# Patient Record
Sex: FEMALE | Race: WHITE | NOT HISPANIC OR LATINO | Employment: OTHER | URBAN - METROPOLITAN AREA
[De-identification: names, ages, dates, MRNs, and addresses within clinical notes are randomized per-mention and may not be internally consistent; named-entity substitution may affect disease eponyms.]

---

## 2017-09-13 ENCOUNTER — GENERIC CONVERSION - ENCOUNTER (OUTPATIENT)
Dept: OTHER | Facility: OTHER | Age: 72
End: 2017-09-13

## 2019-11-25 ENCOUNTER — EVALUATION (OUTPATIENT)
Dept: PHYSICAL THERAPY | Age: 74
End: 2019-11-25
Payer: COMMERCIAL

## 2019-11-25 DIAGNOSIS — M25.562 LEFT KNEE PAIN, UNSPECIFIED CHRONICITY: Primary | ICD-10-CM

## 2019-11-25 DIAGNOSIS — Z96.652 S/P TKR (TOTAL KNEE REPLACEMENT), LEFT: ICD-10-CM

## 2019-11-25 PROCEDURE — G8979 MOBILITY GOAL STATUS: HCPCS | Performed by: PHYSICAL THERAPIST

## 2019-11-25 PROCEDURE — 97113 AQUATIC THERAPY/EXERCISES: CPT | Performed by: PHYSICAL THERAPIST

## 2019-11-25 PROCEDURE — G8978 MOBILITY CURRENT STATUS: HCPCS | Performed by: PHYSICAL THERAPIST

## 2019-11-25 PROCEDURE — 97162 PT EVAL MOD COMPLEX 30 MIN: CPT | Performed by: PHYSICAL THERAPIST

## 2019-11-25 RX ORDER — PRAVASTATIN SODIUM 20 MG
20 TABLET ORAL DAILY
COMMUNITY
End: 2021-11-27 | Stop reason: HOSPADM

## 2019-11-25 NOTE — PROGRESS NOTES
PT Evaluation     Today's date: 2019  Patient name: Jim Bryson  : 1945  MRN: [id number]  Referring provider: Keron Mora MD  Dx:   Encounter Diagnosis     ICD-10-CM    1  Left knee pain, unspecified chronicity M25 562    2  S/P TKR (total knee replacement), left Z96 652        Start Time: 1500  Stop Time: 1600  Total time in clinic (min): 60 minutes    Assessment  Assessment details: Jim Bryson is a 66 y o  female who presents with pain, decreased strength, decreased ROM, joint effusion, ambulatory dysfunction and postural  dysfunction  Due to these impairments, Patient has difficulty performing a/iadls  Patient's clinical presentation is consistent with their referring diagnosis of left TKR  Patient would benefit from skilled physical therapy to address their aforementioned impairments, improve their level of function and to improve their overall quality of life  Impairments: abnormal coordination, abnormal gait, abnormal muscle tone, abnormal or restricted ROM, abnormal movement, activity intolerance, impaired balance, impaired physical strength, lacks appropriate home exercise program, pain with function, safety issue, weight-bearing intolerance, poor posture  and poor body mechanics  Understanding of Dx/Px/POC: good   Prognosis: good    Goals  ST-3 WEEKS  1  Decrease pain by 2 points on VAS at its worst   2   Increase ROM by > 5 deg in all deficients planes  3   Increase LE by 1/2 MMT grade in all deficient planes  LT-6 WEEKS  1  Patient to be independent with a/iadls and improve balance to 10 sec SLS  2  Increase functional activities for leisure and home activities to previous LOF and improve TUG score 30 sec to 20 sec  3   Independent with HEP and/or fitness program     Plan  Patient would benefit from: skilled physical therapy  Planned modality interventions: cryotherapy, electrical stimulation/Russian stimulation, thermotherapy: hydrocollator packs and unattended electrical stimulation  Planned therapy interventions: activity modification, behavior modification, body mechanics training, aquatic therapy, flexibility, functional ROM exercises, home exercise program, IADL retraining, joint mobilization, manual therapy, neuromuscular re-education, patient education, postural training, strengthening, stretching, therapeutic activities and therapeutic exercise  Frequency: 2x week (2-3x week)  Duration in weeks: 12  Plan of Care beginning date: 2019  Plan of Care expiration date: 2020  Treatment plan discussed with: patient        Subjective Evaluation    History of Present Illness  Date of onset: 3/2/2017  Date of surgery: 2019  Mechanism of injury: Left knee DJD, complains of left knee weakness with swelling and diminished balance,  left TKR also right TKR  and LBP and cervical fusion , home PT x 6 weeks for knee  Quality of life: good    Pain  Current pain rating: 3  At best pain ratin  At worst pain ratin  Quality: dull ache and tight  Relieving factors: ice, heat and rest  Aggravating factors: stair climbing, standing and walking  Progression: no change    Social Support  Steps to enter house: yes  Stairs in house: yes   Lives in: multiple-level home  Lives with: spouse      Diagnostic Tests  X-ray: abnormal  Treatments  Previous treatment: injection treatment and physical therapy  Patient Goals  Patient goals for therapy: decreased edema, decreased pain, improved balance, increased motion, increased strength and independence with ADLs/IADLs          Objective     Static Posture     Thoracic Spine  Hyperkyphosis  Tenderness     Right Knee   Tenderness in the pes anserinus       Active Range of Motion   Left Knee   Flexion: 125 degrees   Extension: -3 degrees     Right Knee   Flexion: 130 degrees   Extension: 0 degrees     Strength/Myotome Testing     Left Knee   Flexion: 4-  Extension: 3+  Quadriceps contraction: fair    Right Knee Normal strength    Swelling     Left Knee Girth Measurement (cm)   Joint line: 35 cm    Right Knee Girth Measurement (cm)   Joint line: 33 cm    Ambulation     Observational Gait   Gait: antalgic and asymmetric   Decreased walking speed and stride length       Functional Assessment        Single Leg Stance   Left: 1 seconds  Right: 2 seconds  Neuro Exam:     Functional outcomes   TU (seconds)        Precautions: Cervical fusion , LBP/stenosis, OA, HTN    Daily Treatment Diary     Manual                                                                                   Exercise Diary              Water walking 5            Postural training             Gait training 5            Home exercise pgm/patient education             Wall: t/h raises 1            Hip abd/add  1            squats 1            Knee flex/ext             Step-ups (fwd/bkwd/ss)             SLS (eyes open/closed) 2            SLS w UE mvmt  AROM/ball toss             Weight shifting             UE Noodle work x 4              UE AROM             Resistive UE work (paddles, bells, TB)             Core work on noodle (sitting/stdg)             Sit on noodle with movement             Seated on pool bench w proper posture             Ankle df/pf             Hip Ab/add 1            Knee flex/ext 1            Deep water mvmt             Deep water tx/stretching 5            Specific self - stretches wall/steps 5                Modalities              whirlpool 5

## 2019-11-25 NOTE — LETTER
2019    Tommy Malloy MD  [address] [place] [id number]    Patient: Tr Carvajal   YOB: 1945   Date of Visit: 2019     Encounter Diagnosis     ICD-10-CM    1  Left knee pain, unspecified chronicity M25 562    2  S/P TKR (total knee replacement), left Z00 057        Dear Dr Lacy Larson: Thank you for your recent referral of Tr Carvajal  Please review the attached evaluation summary from [facility] recent visit  Please verify that you agree with the plan of care by signing the attached order  If you have any questions or concerns, please do not hesitate to call  I sincerely appreciate the opportunity to share in the care of one of your patients and hope to have another opportunity to work with you in the near future  Sincerely,    Seth Louie, PT      Referring Provider:      I certify that I have read the below Plan of Care and certify the need for these services furnished under this plan of treatment while under my care  Tommy Malloy MD  [address] [place] [id number]  VIA Facsimile: [phone]          PT Evaluation     Today's date: 2019  Patient name: Tr Carvajal  : 1945  MRN: [id number]  Referring provider: Terry Miranda MD  Dx:   Encounter Diagnosis     ICD-10-CM    1  Left knee pain, unspecified chronicity M25 562    2  S/P TKR (total knee replacement), left Z96 652        Start Time: 1500  Stop Time: 1600  Total time in clinic (min): 60 minutes    Assessment  Assessment details: Tr Carvajal is a 66 y o  female who presents with pain, decreased strength, decreased ROM, joint effusion, ambulatory dysfunction and postural  dysfunction  Due to these impairments, Patient has difficulty performing a/iadls  Patient's clinical presentation is consistent with their referring diagnosis of left TKR   Patient would benefit from skilled physical therapy to address their aforementioned impairments, improve their level of function and to improve their overall quality of life  Impairments: abnormal coordination, abnormal gait, abnormal muscle tone, abnormal or restricted ROM, abnormal movement, activity intolerance, impaired balance, impaired physical strength, lacks appropriate home exercise program, pain with function, safety issue, weight-bearing intolerance, poor posture  and poor body mechanics  Understanding of Dx/Px/POC: good   Prognosis: good    Goals  ST-3 WEEKS  1  Decrease pain by 2 points on VAS at its worst   2   Increase ROM by > 5 deg in all deficients planes  3   Increase LE by 1/2 MMT grade in all deficient planes  LT-6 WEEKS  1  Patient to be independent with a/iadls and improve balance to 10 sec SLS  2  Increase functional activities for leisure and home activities to previous LOF and improve TUG score 30 sec to 20 sec  3   Independent with HEP and/or fitness program     Plan  Patient would benefit from: skilled physical therapy  Planned modality interventions: cryotherapy, electrical stimulation/Russian stimulation, thermotherapy: hydrocollator packs and unattended electrical stimulation  Planned therapy interventions: activity modification, behavior modification, body mechanics training, aquatic therapy, flexibility, functional ROM exercises, home exercise program, IADL retraining, joint mobilization, manual therapy, neuromuscular re-education, patient education, postural training, strengthening, stretching, therapeutic activities and therapeutic exercise  Frequency: 2x week (2-3x week)  Duration in weeks: 12  Plan of Care beginning date: 2019  Plan of Care expiration date: 2020  Treatment plan discussed with: patient        Subjective Evaluation    History of Present Illness  Date of onset: 3/2/2017  Date of surgery: 2019  Mechanism of injury: Left knee DJD, complains of left knee weakness with swelling and diminished balance,  left TKR also right TKR  and LBP and cervical fusion , home PT x 6 weeks for knee  Quality of life: good    Pain  Current pain rating: 3  At best pain ratin  At worst pain ratin  Quality: dull ache and tight  Relieving factors: ice, heat and rest  Aggravating factors: stair climbing, standing and walking  Progression: no change    Social Support  Steps to enter house: yes  Stairs in house: yes   Lives in: multiple-level home  Lives with: spouse      Diagnostic Tests  X-ray: abnormal  Treatments  Previous treatment: injection treatment and physical therapy  Patient Goals  Patient goals for therapy: decreased edema, decreased pain, improved balance, increased motion, increased strength and independence with ADLs/IADLs          Objective     Static Posture     Thoracic Spine  Hyperkyphosis  Tenderness     Right Knee   Tenderness in the pes anserinus  Active Range of Motion   Left Knee   Flexion: 125 degrees   Extension: -3 degrees     Right Knee   Flexion: 130 degrees   Extension: 0 degrees     Strength/Myotome Testing     Left Knee   Flexion: 4-  Extension: 3+  Quadriceps contraction: fair    Right Knee   Normal strength    Swelling     Left Knee Girth Measurement (cm)   Joint line: 35 cm    Right Knee Girth Measurement (cm)   Joint line: 33 cm    Ambulation     Observational Gait   Gait: antalgic and asymmetric   Decreased walking speed and stride length       Functional Assessment        Single Leg Stance   Left: 1 seconds  Right: 2 seconds  Neuro Exam:     Functional outcomes   TU (seconds)        Precautions: Cervical fusion , LBP/stenosis, OA, HTN    Daily Treatment Diary     Manual                                                                                   Exercise Diary              Water walking 5            Postural training             Gait training 5            Home exercise pgm/patient education             Wall: t/h raises 1            Hip abd/add 1 Marching 1            squats 1            Knee flex/ext             Step-ups (fwd/bkwd/ss)             SLS (eyes open/closed) 2            SLS w UE mvmt  AROM/ball toss             Weight shifting             UE Noodle work x 4              UE AROM             Resistive UE work (paddles, bells, TB)             Core work on noodle (sitting/stdg)             Sit on noodle with movement             Seated on pool bench w proper posture             Ankle df/pf 1            marching 1            Hip Ab/add 1            Knee flex/ext 1            Deep water mvmt             Deep water tx/stretching 5            Specific self - stretches wall/steps 5                Modalities              whirlpool 5

## 2019-11-26 ENCOUNTER — TRANSCRIBE ORDERS (OUTPATIENT)
Dept: PHYSICAL THERAPY | Age: 74
End: 2019-11-26

## 2019-11-26 DIAGNOSIS — M25.562 LEFT KNEE PAIN, UNSPECIFIED CHRONICITY: Primary | ICD-10-CM

## 2019-11-26 DIAGNOSIS — Z96.652 S/P TKR (TOTAL KNEE REPLACEMENT), LEFT: ICD-10-CM

## 2019-11-27 ENCOUNTER — OFFICE VISIT (OUTPATIENT)
Dept: PHYSICAL THERAPY | Age: 74
End: 2019-11-27
Payer: COMMERCIAL

## 2019-11-27 DIAGNOSIS — M25.562 LEFT KNEE PAIN, UNSPECIFIED CHRONICITY: Primary | ICD-10-CM

## 2019-11-27 DIAGNOSIS — Z96.652 S/P TKR (TOTAL KNEE REPLACEMENT), LEFT: ICD-10-CM

## 2019-11-27 PROCEDURE — 97113 AQUATIC THERAPY/EXERCISES: CPT

## 2019-11-27 NOTE — PROGRESS NOTES
Daily Note     Today's date: 2019  Patient name: Dao Green  : 1945  MRN: [id number]  Referring provider: Jackelin Hermosillo MD  Dx:   Encounter Diagnosis     ICD-10-CM    1  Left knee pain, unspecified chronicity M25 562    2  S/P TKR (total knee replacement), left Z96 652        Start Time: 1100  Stop Time: 1205  Total time in clinic (min): 65 minutes    Subjective: pt notes she felt pretty good after initial session w/ improved ROM in L knee  Objective: See treatment diary below      Assessment: Tolerated treatment fairly well  Continued [employer] using a noodle for assistance  Added seated ex's today for LE's  Continued 1:1 in the water w/ the patient due to poor balance and sig weakness  Added rhythmic stab to challenge balance and core, added core ex's on noodle today, and progressed pt to ambulate w/o assistance 20 feet today  Patient would benefit from continued PT      Plan: Continue per plan of care        Precautions: Cervical fusion , LBP/stenosis, OA, HTN    Daily Treatment Diary       Exercise Diary             Water walking 5 10           Postural training             Gait training 5 5           Home exercise pgm/patient education             Wall: t/h raises 1 1           Hip abd/add 1 1            1 1           squats 1 1           Knee flex/ext  1           Step-ups (fwd/bkwd/ss)             SLS (eyes open/closed) 2 2           SLS w UE mvmt  AROM/ball toss             Rhythmic stab  5'           UE Noodle work x 4              UE AROM             Resistive UE work (paddles, bells, TB)             Core work on noodle (sitting/stdg)  5'           Sit on noodle with movement             Seated on pool bench w proper posture             Ankle df/pf 1 2            1 2           Hip Ab/add 1 2           Knee flex/ext 1 2           Deep water mvmt             Deep water tx/stretching 5 10           Specific self - stretches wall/steps 5 2 Modalities   11/27           whirlpool 5 10

## 2019-12-02 ENCOUNTER — APPOINTMENT (OUTPATIENT)
Dept: PHYSICAL THERAPY | Age: 74
End: 2019-12-02
Payer: COMMERCIAL

## 2019-12-05 ENCOUNTER — APPOINTMENT (OUTPATIENT)
Dept: PHYSICAL THERAPY | Age: 74
End: 2019-12-05
Payer: COMMERCIAL

## 2019-12-09 ENCOUNTER — OFFICE VISIT (OUTPATIENT)
Dept: PHYSICAL THERAPY | Age: 74
End: 2019-12-09
Payer: COMMERCIAL

## 2019-12-09 DIAGNOSIS — M25.562 LEFT KNEE PAIN, UNSPECIFIED CHRONICITY: Primary | ICD-10-CM

## 2019-12-09 DIAGNOSIS — Z96.652 S/P TKR (TOTAL KNEE REPLACEMENT), LEFT: ICD-10-CM

## 2019-12-09 PROCEDURE — 97113 AQUATIC THERAPY/EXERCISES: CPT

## 2019-12-09 NOTE — PROGRESS NOTES
Daily Note     Today's date: 2019  Patient name: Dao Green  : 1945  MRN: [id number]  Referring provider: Jackelin Hermosillo MD  Dx:   Encounter Diagnosis     ICD-10-CM    1  Left knee pain, unspecified chronicity M25 562    2  S/P TKR (total knee replacement), left Z96 652        Start Time: 1400  Stop Time: 1505  Total time in clinic (min): 65 minutes    Subjective: pt notes she is in a lot of pain in her LB and pain is going into her abdomen  Pt is to see MD tomorrow  She also notes She states she couldn't make it here last visit due to bad weather  Objective: See treatment diary below      Assessment: Tolerated treatment fairly today  Pt has high pain levels of LBP  Added manual stretches today in the water  Pt instructed on relaxing and breathing in supine position today to relieve pain and tightness  After manual stretches pt was able to complete her ex's w/ less pain overall  Pt had sig pain relief today after session  Improved gait and posture after session  Pt continues w/ sig weakness in L foot  Patient would benefit from continued PT      Plan: Continue per plan of care        Precautions: Cervical fusion , LBP/stenosis, OA, HTN    Daily Treatment Diary       Exercise Diary            Water walking 5 10 10          Postural training             Gait training 5 5 5          Home exercise pgm/patient education             Wall: t/h raises 1 1 1          Hip abd/add 1 1 1           1 1 1          squats 1 1 1          Knee flex/ext  1 1          Step-ups (fwd/bkwd/ss)             SLS (eyes open/closed) 2 2           SLS w UE mvmt  AROM/ball toss             Rhythmic stab  5'           UE Noodle work x 4              UE AROM             Resistive UE work (paddles, bells, TB)             Core work on noodle (sitting/stdg)  5'           Sit on noodle with movement             Manual stretches   10          Ankle df/pf 1 2 1           1 2 2          Hip Ab/add 1 2 1          Knee flex/ext 1 2 2          Deep water mvmt   5          Deep water tx/stretching 5 10 10          Specific self - stretches wall/steps 5 2 2              Modalities   11/27 12/9          whirlpool 5 10 10

## 2019-12-11 ENCOUNTER — APPOINTMENT (OUTPATIENT)
Dept: PHYSICAL THERAPY | Age: 74
End: 2019-12-11
Payer: COMMERCIAL

## 2019-12-12 ENCOUNTER — OFFICE VISIT (OUTPATIENT)
Dept: PHYSICAL THERAPY | Age: 74
End: 2019-12-12
Payer: COMMERCIAL

## 2019-12-12 DIAGNOSIS — Z96.652 S/P TKR (TOTAL KNEE REPLACEMENT), LEFT: ICD-10-CM

## 2019-12-12 DIAGNOSIS — M25.562 LEFT KNEE PAIN, UNSPECIFIED CHRONICITY: Primary | ICD-10-CM

## 2019-12-12 PROCEDURE — 97113 AQUATIC THERAPY/EXERCISES: CPT

## 2019-12-12 NOTE — PROGRESS NOTES
Daily Note     Today's date: 2019  Patient name: Demetra Bee  : 1945  MRN: [id number]  Referring provider: Darrin Song MD  Dx:   Encounter Diagnosis     ICD-10-CM    1  Left knee pain, unspecified chronicity M25 562    2  S/P TKR (total knee replacement), left Z96 652        Start Time: 1105  Stop Time: 1200  Total time in clinic (min): 55 minutes    Subjective: pt notes she is feeling a little better  L knee pain is minimal "just feels tight"  Objective: See treatment diary below      Assessment: Tolerated treatment fairly today  No manual stretches today  Pt was able to complete all ex's as per flowsheet w/o complaints  Slow improvement w/ balance today  Pt was able to walk about 10 feet in the water w/o any assistance  Will increase pt's program next visit as tolerated  Patient would benefit from continued PT      Plan: Continue per plan of care        Precautions: Cervical fusion , LBP/stenosis, OA, HTN    Daily Treatment Diary       Exercise Diary           Water walking 5 10 10 12         Postural training             Gait training 5 5 5          Home exercise pgm/patient education             Wall: t/h raises 1 1 1 1         Hip abd/add 1 1 1 2          1 1 1 1         squats 1 1 1 1         Knee flex/ext  1 1 1         Step-ups (fwd/bkwd/ss)             SLS (eyes open/closed) 2 2  2         SLS w UE mvmt  AROM/ball toss             Rhythmic stab  5'           UE Noodle work x 4              UE AROM             Resistive UE work (paddles, bells, TB)             Core work on noodle (sitting/stdg)  5'           Sit on noodle with movement             Manual stretches   10          Ankle df/pf 1 2 1 1          1 2 2 2         Hip Ab/add 1 2 1 1         Knee flex/ext 1 2 2 2         Deep water mvmt   5 5         Deep water tx/stretching 5 10 10 10         Specific self - stretches wall/steps 5 2 2 2             Modalities    whirlpool 5 10 10 10

## 2019-12-16 ENCOUNTER — APPOINTMENT (OUTPATIENT)
Dept: PHYSICAL THERAPY | Age: 74
End: 2019-12-16
Payer: COMMERCIAL

## 2019-12-18 ENCOUNTER — OFFICE VISIT (OUTPATIENT)
Dept: PHYSICAL THERAPY | Age: 74
End: 2019-12-18
Payer: COMMERCIAL

## 2019-12-18 DIAGNOSIS — Z96.652 S/P TKR (TOTAL KNEE REPLACEMENT), LEFT: ICD-10-CM

## 2019-12-18 DIAGNOSIS — M25.562 LEFT KNEE PAIN, UNSPECIFIED CHRONICITY: Primary | ICD-10-CM

## 2019-12-18 PROCEDURE — 97113 AQUATIC THERAPY/EXERCISES: CPT

## 2019-12-18 NOTE — PROGRESS NOTES
Daily Note     Today's date: 2019  Patient name: Katerin Saldivar  : 1945  MRN: [id number]  Referring provider: Dalila Seo MD  Dx:   Encounter Diagnosis     ICD-10-CM    1  Left knee pain, unspecified chronicity M25 562    2  S/P TKR (total knee replacement), left Z96 652        Start Time: 1335  Stop Time: 1430  Total time in clinic (min): 55 minutes    Subjective: Patient reports her lower back is spasming today, notes that her left knee is 2-3/10 pain  Objective: See treatment diary below      Assessment: Tolerated treatment well, continue to struggle with ambulating in and out of the water  Patient demonstrated fatigue post treatment and would benefit from continued PT      Plan: Continue per plan of care        Precautions: Cervical fusion , LBP/stenosis, OA, HTN    Daily Treatment Diary       Exercise Diary          Water walking 5 10 10 12 12        Postural training             Gait training 5 5 5          Home exercise pgm/patient education             Wall: t/h raises 1 1 1 1 1        Hip abd/add 1 1 1 2 2        Marching 1 1 1 1 1        squats 1 1 1 1 1        Knee flex/ext  1 1 1 1        Step-ups (fwd/bkwd/ss)             SLS (eyes open/closed) 2 2  2 2        SLS w UE mvmt  AROM/ball toss             Rhythmic stab  5'           UE Noodle work x 4              UE AROM             Resistive UE work (paddles, bells, TB)             Core work on noodle (sitting/stdg)  5'           Sit on noodle with movement             Manual stretches   10          Ankle df/pf 1 2 1 1 1        marching 1 2 2 2 2        Hip Ab/add 1 2 1 1 1        Knee flex/ext 1 2 2 2 2        Deep water mvmt   5 5 5        Deep water tx/stretching 5 10 10 10 10        Specific self - stretches wall/steps 5 2 2 2 2            Modalities           whirlpool 5 10 10 10 10

## 2019-12-20 ENCOUNTER — OFFICE VISIT (OUTPATIENT)
Dept: PHYSICAL THERAPY | Age: 74
End: 2019-12-20
Payer: COMMERCIAL

## 2019-12-20 ENCOUNTER — APPOINTMENT (OUTPATIENT)
Dept: PHYSICAL THERAPY | Age: 74
End: 2019-12-20
Payer: COMMERCIAL

## 2019-12-20 DIAGNOSIS — M25.562 LEFT KNEE PAIN, UNSPECIFIED CHRONICITY: Primary | ICD-10-CM

## 2019-12-20 DIAGNOSIS — Z96.652 S/P TKR (TOTAL KNEE REPLACEMENT), LEFT: ICD-10-CM

## 2019-12-20 PROCEDURE — 97113 AQUATIC THERAPY/EXERCISES: CPT

## 2019-12-20 NOTE — PROGRESS NOTES
Daily Note     Today's date: 2019  Patient name: Kristin Nguyen  : 1945  MRN: [id number]  Referring provider: Dona Avila MD  Dx:   Encounter Diagnosis     ICD-10-CM    1  Left knee pain, unspecified chronicity M25 562    2  S/P TKR (total knee replacement), left Z96 652        Start Time: 1415  Stop Time: 1510  Total time in clinic (min): 55 minutes    Subjective: Patient reports increased soreness and stiffness in her left knee, notes she conts to have pain with her lb  Objective: See treatment diary below      Assessment: Tolerated treatment well, decreased balance throughout the water therapy today  Did well with her aquatic exercises  With no additional pain  Patient demonstrated fatigue post treatment and would benefit from continued PT      Plan: Continue per plan of care        Precautions: Cervical fusion , LBP/stenosis, OA, HTN    Daily Treatment Diary       Exercise Diary         Water walking 5 10 10 12 12 12       Postural training             Gait training 5 5 5          Home exercise pgm/patient education             Wall: t/h raises 1 1 1 1 1 1       Hip abd/add 1 1 1 2 2 2        1 1 1 1 1 1       squats 1 1 1 1 1 1       Knee flex/ext  1 1 1 1 1       Step-ups (fwd/bkwd/ss)             SLS (eyes open/closed) 2 2  2 2 2       SLS w UE mvmt  AROM/ball toss             Rhythmic stab  5'           UE Noodle work x 4              UE AROM             Resistive UE work (paddles, bells, TB)             Core work on noodle (sitting/stdg)  5'           Sit on noodle with movement             Manual stretches   10          Ankle df/pf 1 2 1 1 1 1        1 2 2 2 2 2       Hip Ab/add 1 2 1 1 1 1       Knee flex/ext 1 2 2 2 2 1       Deep water mvmt   5 5 5 5'       Deep water tx/stretching 5 10 10 10 10 10       Specific self - stretches wall/steps 5 2 2 2 2 2           Modalities          whirlpool 5 10 10 10 10 10

## 2019-12-27 ENCOUNTER — EVALUATION (OUTPATIENT)
Dept: PHYSICAL THERAPY | Age: 74
End: 2019-12-27
Payer: COMMERCIAL

## 2019-12-27 DIAGNOSIS — M54.40 BILATERAL LOW BACK PAIN WITH SCIATICA, SCIATICA LATERALITY UNSPECIFIED, UNSPECIFIED CHRONICITY: ICD-10-CM

## 2019-12-27 DIAGNOSIS — M54.16 LUMBAR RADICULOPATHY: Primary | ICD-10-CM

## 2019-12-27 PROCEDURE — 97110 THERAPEUTIC EXERCISES: CPT | Performed by: PHYSICAL THERAPIST

## 2019-12-27 PROCEDURE — 97162 PT EVAL MOD COMPLEX 30 MIN: CPT | Performed by: PHYSICAL THERAPIST

## 2019-12-27 PROCEDURE — G8979 MOBILITY GOAL STATUS: HCPCS | Performed by: PHYSICAL THERAPIST

## 2019-12-27 PROCEDURE — G8978 MOBILITY CURRENT STATUS: HCPCS | Performed by: PHYSICAL THERAPIST

## 2019-12-27 PROCEDURE — 97140 MANUAL THERAPY 1/> REGIONS: CPT | Performed by: PHYSICAL THERAPIST

## 2019-12-27 NOTE — LETTER
2020    Alden Hernandez MD  [address] [place] [id number]    Patient: Filemon Gonzales   YOB: 1945   Date of Visit: 2019     Encounter Diagnosis     ICD-10-CM    1  Lumbar radiculopathy M54 16    2  Bilateral low back pain with sciatica, sciatica laterality unspecified, unspecified chronicity M54 40        Dear Dr Hector Hernandez: Thank you for your recent referral of Filemon Gonzales  Please review the attached evaluation summary from [facility] recent visit  Please verify that you agree with the plan of care by signing the attached order  If you have any questions or concerns, please do not hesitate to call  I sincerely appreciate the opportunity to share in the care of one of your patients and hope to have another opportunity to work with you in the near future  Sincerely,    Sabrina Canavan, PT      Referring Provider:      I certify that I have read the below Plan of Care and certify the need for these services furnished under this plan of treatment while under my care  Alden Hernandez MD  [address] [place] [id number]  VIA Facsimile: [phone]          PT Evaluation     Today's date: 2019  Patient name: Filemon Gonzales  : 1945  MRN: [id number]  Referring provider: Julian Borges MD  Dx:   Encounter Diagnosis     ICD-10-CM    1  Lumbar radiculopathy M54 16    2  Bilateral low back pain with sciatica, sciatica laterality unspecified, unspecified chronicity M54 40                   Assessment  Assessment details: Filemon Gonzales is a 66 y o  female who presents with pain, decreased strength, decreased ROM, ambulatory dysfunction and postural  dysfunction  Due to these impairments, Patient has difficulty performing a/iadls  Patient's clinical presentation is consistent with their referring diagnosis of Low Back Pain   Patient would benefit from skilled physical therapy to address their aforementioned impairments, improve their level of function and to improve their overall quality of life  Impairments: abnormal or restricted ROM, activity intolerance, impaired physical strength, lacks appropriate home exercise program, pain with function, poor posture  and poor body mechanics  Understanding of Dx/Px/POC: good   Prognosis: fair    Goals  ST-3 WEEKS  1  Decrease pain by 2 points on VAS at its worst   2   Increase ROM by > 5 deg in all deficients planes  3   Increase CORE/LE by 1/2 MMT grade in all deficient planes  LT-6 WEEKS  1  Patient to be independent with a/iadls and improve balance  2  Increase functional activities for leisure and home activities to previous LOF    3  Independent with HEP and/or fitness program     Plan  Patient would benefit from: skilled physical therapy  Planned modality interventions: cryotherapy, electrical stimulation/Russian stimulation, thermotherapy: hydrocollator packs and unattended electrical stimulation  Planned therapy interventions: activity modification, behavior modification, body mechanics training, aquatic therapy, flexibility, functional ROM exercises, home exercise program, IADL retraining, joint mobilization, manual therapy, neuromuscular re-education, patient education, postural training, strengthening, stretching, therapeutic activities and therapeutic exercise  Frequency: 2x week (2-3x week)  Duration in weeks: 12  Plan of Care beginning date: 2019  Plan of Care expiration date: 3/27/2020  Treatment plan discussed with: patient        Subjective Evaluation    History of Present Illness  Date of onset: 3/2/2000  Mechanism of injury: Back pain x 20 years secondary to HNP's, complains of left  low back pain with left pain, spasms, pain management helpful          Recurrent probem    Quality of life: good    Pain  Current pain ratin  At best pain ratin  At worst pain ratin  Quality: squeezing, knife-like and radiating  Relieving factors: medications and relaxation  Aggravating factors: walking, stair climbing, standing and lifting  Progression: worsening    Social Support  Steps to enter house: yes  Stairs in house: yes   Lives in: multiple-level home  Lives with: spouse      Diagnostic Tests  X-ray: abnormal  MRI studies: abnormal    FCE comments: MRI next week scheduledTreatments  Previous treatment: chiropractic, injection treatment and physical therapy  Patient Goals  Patient goals for therapy: decreased pain, improved balance, increased motion, increased strength and independence with ADLs/IADLs          Objective     Static Posture     Thoracic Spine  Hyperkyphosis  Palpation   Left   Hypertonic in the erector spinae and lumbar paraspinals  Right   Hypertonic in the erector spinae and lumbar paraspinals  Tenderness     Left Hip   Tenderness in the PSIS  Active Range of Motion     Lumbar   Flexion: 85 degrees   Extension: 25 degrees   Left lateral flexion: 30 degrees       Right lateral flexion: 30 degrees     Strength/Myotome Testing     Left Hip   Planes of Motion   Flexion: 4-  Extension: 4-  Abduction: 4-  Adduction: 4    Right Hip   Planes of Motion   Flexion: 4  Extension: 4  Abduction: 4  Adduction: 4    Left Knee   Flexion: 4-  Extension: 4-    Right Knee   Flexion: 4+  Extension: 4    Left Ankle/Foot   Dorsiflexion: 3+  Plantar flexion: 4-    Right Ankle/Foot   Dorsiflexion: 4  Plantar flexion: 4    Additional Strength Details  Core is 3+/5    Tests     Lumbar     Left   Negative passive SLR  Right   Negative passive SLR  Left Hip   Positive FADIR  Negative CHANDLER  Ambulation     Observational Gait   Gait: antalgic, asymmetric and crouched   Decreased walking speed and stride length       Additional Observational Gait Details  Mild left drop foot    Functional Assessment        Single Leg Stance   Left: 1 seconds  Right: 3 seconds             Precautions: Left partial knee TKR, OA, CA      Manual  12/27                         MT LB/LE 10                                                       Exercise Diary  12/27            Ball exs 30x            TB hip abd 30x            Ball squeeze 30x            NU step 5 min                                                                                                                                                                                                                                Modalities  12/27                         HT 10

## 2019-12-27 NOTE — PROGRESS NOTES
PT Evaluation     Today's date: 2019  Patient name: Lilian Knight  : 1945  MRN: [id number]  Referring provider: Zaynab Nelson MD  Dx:   Encounter Diagnosis     ICD-10-CM    1  Lumbar radiculopathy M54 16    2  Bilateral low back pain with sciatica, sciatica laterality unspecified, unspecified chronicity M54 40                   Assessment  Assessment details: Lilian Knight is a 66 y o  female who presents with pain, decreased strength, decreased ROM, ambulatory dysfunction and postural  dysfunction  Due to these impairments, Patient has difficulty performing a/iadls  Patient's clinical presentation is consistent with their referring diagnosis of Low Back Pain  Patient would benefit from skilled physical therapy to address their aforementioned impairments, improve their level of function and to improve their overall quality of life  Impairments: abnormal or restricted ROM, activity intolerance, impaired physical strength, lacks appropriate home exercise program, pain with function, poor posture  and poor body mechanics  Understanding of Dx/Px/POC: good   Prognosis: fair    Goals  ST-3 WEEKS  1  Decrease pain by 2 points on VAS at its worst   2   Increase ROM by > 5 deg in all deficients planes  3   Increase CORE/LE by 1/2 MMT grade in all deficient planes  LT-6 WEEKS  1  Patient to be independent with a/iadls and improve balance  2  Increase functional activities for leisure and home activities to previous LOF    3  Independent with HEP and/or fitness program     Plan  Patient would benefit from: skilled physical therapy  Planned modality interventions: cryotherapy, electrical stimulation/Russian stimulation, thermotherapy: hydrocollator packs and unattended electrical stimulation  Planned therapy interventions: activity modification, behavior modification, body mechanics training, aquatic therapy, flexibility, functional ROM exercises, home exercise program, IADL retraining, joint mobilization, manual therapy, neuromuscular re-education, patient education, postural training, strengthening, stretching, therapeutic activities and therapeutic exercise  Frequency: 2x week (2-3x week)  Duration in weeks: 12  Plan of Care beginning date: 2019  Plan of Care expiration date: 3/27/2020  Treatment plan discussed with: patient        Subjective Evaluation    History of Present Illness  Date of onset: 3/2/2000  Mechanism of injury: Back pain x 20 years secondary to HNP's, complains of left  low back pain with left pain, spasms, pain management helpful          Recurrent probem    Quality of life: good    Pain  Current pain ratin  At best pain ratin  At worst pain ratin  Quality: squeezing, knife-like and radiating  Relieving factors: medications and relaxation  Aggravating factors: walking, stair climbing, standing and lifting  Progression: worsening    Social Support  Steps to enter house: yes  Stairs in house: yes   Lives in: multiple-level home  Lives with: spouse      Diagnostic Tests  X-ray: abnormal  MRI studies: abnormal    FCE comments: MRI next week scheduledTreatments  Previous treatment: chiropractic, injection treatment and physical therapy  Patient Goals  Patient goals for therapy: decreased pain, improved balance, increased motion, increased strength and independence with ADLs/IADLs          Objective     Static Posture     Thoracic Spine  Hyperkyphosis  Palpation   Left   Hypertonic in the erector spinae and lumbar paraspinals  Right   Hypertonic in the erector spinae and lumbar paraspinals  Tenderness     Left Hip   Tenderness in the PSIS       Active Range of Motion     Lumbar   Flexion: 85 degrees   Extension: 25 degrees   Left lateral flexion: 30 degrees       Right lateral flexion: 30 degrees     Strength/Myotome Testing     Left Hip   Planes of Motion   Flexion: 4-  Extension: 4-  Abduction: 4-  Adduction: 4    Right Hip   Planes of Motion   Flexion: 4  Extension: 4  Abduction: 4  Adduction: 4    Left Knee   Flexion: 4-  Extension: 4-    Right Knee   Flexion: 4+  Extension: 4    Left Ankle/Foot   Dorsiflexion: 3+  Plantar flexion: 4-    Right Ankle/Foot   Dorsiflexion: 4  Plantar flexion: 4    Additional Strength Details  Core is 3+/5    Tests     Lumbar     Left   Negative passive SLR  Right   Negative passive SLR  Left Hip   Positive FADIR  Negative CHANDLER  Ambulation     Observational Gait   Gait: antalgic, asymmetric and crouched   Decreased walking speed and stride length       Additional Observational Gait Details  Mild left drop foot    Functional Assessment        Single Leg Stance   Left: 1 seconds  Right: 3 seconds             Precautions: Left partial knee TKR, OA, CA      Manual  12/27                         MT LB/LE 10                                                       Exercise Diary  12/27            Ball exs 30x            TB hip abd 30x            Ball squeeze 30x            NU step 5 min                                                                                                                                                                                                                                Modalities  12/27                         HT 10

## 2020-01-02 ENCOUNTER — TRANSCRIBE ORDERS (OUTPATIENT)
Dept: PHYSICAL THERAPY | Age: 75
End: 2020-01-02

## 2020-01-02 DIAGNOSIS — M54.40 BILATERAL LOW BACK PAIN WITH SCIATICA, SCIATICA LATERALITY UNSPECIFIED, UNSPECIFIED CHRONICITY: ICD-10-CM

## 2020-01-02 DIAGNOSIS — M54.16 LUMBAR RADICULOPATHY: Primary | ICD-10-CM

## 2020-01-02 DIAGNOSIS — M25.562 LEFT KNEE PAIN, UNSPECIFIED CHRONICITY: ICD-10-CM

## 2020-01-02 DIAGNOSIS — Z96.652 S/P TKR (TOTAL KNEE REPLACEMENT), LEFT: ICD-10-CM

## 2020-01-03 ENCOUNTER — OFFICE VISIT (OUTPATIENT)
Dept: PHYSICAL THERAPY | Age: 75
End: 2020-01-03
Payer: COMMERCIAL

## 2020-01-03 DIAGNOSIS — M54.40 BILATERAL LOW BACK PAIN WITH SCIATICA, SCIATICA LATERALITY UNSPECIFIED, UNSPECIFIED CHRONICITY: ICD-10-CM

## 2020-01-03 DIAGNOSIS — M54.16 LUMBAR RADICULOPATHY: Primary | ICD-10-CM

## 2020-01-03 DIAGNOSIS — Z96.652 S/P TKR (TOTAL KNEE REPLACEMENT), LEFT: ICD-10-CM

## 2020-01-03 DIAGNOSIS — M25.562 LEFT KNEE PAIN, UNSPECIFIED CHRONICITY: ICD-10-CM

## 2020-01-03 PROCEDURE — 97113 AQUATIC THERAPY/EXERCISES: CPT

## 2020-01-03 NOTE — PROGRESS NOTES
Daily Note     Today's date: 1/3/2020  Patient name: Salazar Salcedo  : 1945  MRN: [id number]  Referring provider: Debra Tolbert MD  Dx:   Encounter Diagnosis     ICD-10-CM    1  Lumbar radiculopathy M54 16    2  Bilateral low back pain with sciatica, sciatica laterality unspecified, unspecified chronicity M54 40    3  Left knee pain, unspecified chronicity M25 562    4  S/P TKR (total knee replacement), left Z96 [id number]        Start Time: 1605  Stop Time: 1700  Total time in clinic (min): 55 minutes    Subjective: pt c/o LBP today 4/10 and L knee pain 5-610  Post session pain noted in LB 2/10 and L knee 3/10  Objective: See treatment diary below      Assessment: Tolerated treatment fairly well  Added manual LB stretches in supine today using a nekdoodle for her neck and under her knees  Pt had a positive response to treatment today  Decreased pain in LB  Patient would benefit from continued PT      Plan: Continue per plan of care        Precautions: Left partial knee TKR, OA, CA      Exercise Diary  11/25 11/27 12/9 12/12 12/18 12/20 1/3      Water walking 5 10 10 12 12 12 12      Postural training             Gait training 5 5 5          Home exercise pgm/patient education             Wall: t/h raises 1 1 1 1 1 1 1      Hip abd/add 1 1 1 2 2 2 2       1 1 1 1 1 1 1      squats 1 1 1 1 1 1 1      Knee flex/ext  1 1 1 1 1 1      Step-ups (fwd/bkwd/ss)             SLS (eyes open/closed) 2 2  2 2 2 2      SLS w UE mvmt  AROM/ball toss             Rhythmic stab  5'           UE Noodle work x 4              UE AROM             Resistive UE work (paddles, bells, TB)             Core work on noodle (sitting/stdg)  5'           Sit on noodle with movement             Manual stretches   10          Ankle df/pf 1 2 1 1 1 1 1      marching 1 2 2 2 2 2 2      Hip Ab/add 1 2 1 1 1 1 1      Knee flex/ext 1 2 2 2 2 1 1      Deep water mvmt   5 5 5 5' 5      Deep water tx/stretching 5 10 10 10 10 10 10 man Specific self - stretches wall/steps 5 2 2 2 2 2 3          Modalities   11/27 12/9 12/12 12/18 12/20 1/3      whirlpool 5 10 10 10 10 10 10

## 2020-01-06 ENCOUNTER — OFFICE VISIT (OUTPATIENT)
Dept: PHYSICAL THERAPY | Age: 75
End: 2020-01-06
Payer: COMMERCIAL

## 2020-01-06 DIAGNOSIS — M25.562 LEFT KNEE PAIN, UNSPECIFIED CHRONICITY: ICD-10-CM

## 2020-01-06 DIAGNOSIS — M54.16 LUMBAR RADICULOPATHY: Primary | ICD-10-CM

## 2020-01-06 DIAGNOSIS — Z96.652 S/P TKR (TOTAL KNEE REPLACEMENT), LEFT: ICD-10-CM

## 2020-01-06 PROCEDURE — 97113 AQUATIC THERAPY/EXERCISES: CPT | Performed by: PHYSICAL THERAPIST

## 2020-01-06 NOTE — PROGRESS NOTES
Daily Note     Today's date: 2020  Patient name: Dao Green  : 1945  MRN: [id number]  Referring provider: Elizabeth Story MD  Dx:   Encounter Diagnosis     ICD-10-CM    1  Lumbar radiculopathy M54 16    2  Left knee pain, unspecified chronicity M25 562    3  S/P TKR (total knee replacement), left [id number] [id number]        Start Time: 1100  Stop Time: 1200  Total time in clinic (min): 60 minutes    Subjective: Patient reports 4/10 left knee pain and 6/10 low back pain  She reports the inclement weather increases symptoms in both areas  Objective: See treatment diary below      Assessment: Tolerated treatment well  Patient demonstrated fatigue post treatment and would benefit from continued PT      Plan: Continue per plan of care  Progress treatment as tolerated         Precautions: Left partial knee TKR, OA, CA      Exercise Diary  11/25 11/27 12/9 12/12 12/18 12/20 1/3/20 1/6/20     Water walking 5 10 10 12 12 12 12 12     Postural training             Gait training 5 5 5          Home exercise pgm/patient education             Wall: t/h raises 1 1 1 1 1 1 1 1     Hip abd/add 1 1 1 2 2 2 2 2     Marching 1 1 1 1 1 1 1 1     squats 1 1 1 1 1 1 1 1     Knee flex/ext  1 1 1 1 1 1 1     Step-ups (fwd/bkwd/ss)             SLS (eyes open/closed) 2 2  2 2 2 2 2     SLS w UE mvmt  AROM/ball toss             Rhythmic stab  5'           UE Noodle work x 4              UE AROM             Resistive UE work (paddles, bells, TB)             Core work on noodle (sitting/stdg)  5'           Sit on noodle with movement             Manual stretches   10          Ankle df/pf 1 2 1 1 1 1 1 1     marching 1 2 2 2 2 2 2 2     Hip Ab/add 1 2 1 1 1 1 1 1     Knee flex/ext 1 2 2 2 2 1 1 1     Deep water mvmt   5 5 5 5' 5 5     Deep water tx/stretching 5 10 10 10 10 10 10 man 10     Specific self - stretches wall/steps 5 2 2 2 2 2 3 3         Modalities   11/27 12/9 12/12 12/18 12/20 1/3/20 1/6/20     whirlpool 5 10 10 10 10 10 10 10

## 2020-01-09 ENCOUNTER — OFFICE VISIT (OUTPATIENT)
Dept: PHYSICAL THERAPY | Age: 75
End: 2020-01-09
Payer: COMMERCIAL

## 2020-01-09 DIAGNOSIS — Z96.652 S/P TKR (TOTAL KNEE REPLACEMENT), LEFT: ICD-10-CM

## 2020-01-09 DIAGNOSIS — M25.562 LEFT KNEE PAIN, UNSPECIFIED CHRONICITY: ICD-10-CM

## 2020-01-09 DIAGNOSIS — M54.16 LUMBAR RADICULOPATHY: Primary | ICD-10-CM

## 2020-01-09 DIAGNOSIS — M54.40 BILATERAL LOW BACK PAIN WITH SCIATICA, SCIATICA LATERALITY UNSPECIFIED, UNSPECIFIED CHRONICITY: ICD-10-CM

## 2020-01-09 PROCEDURE — 97113 AQUATIC THERAPY/EXERCISES: CPT

## 2020-01-09 NOTE — PROGRESS NOTES
Daily Note     Today's date: 2020  Patient name: Filemon Gonzales  : 1945  MRN: [id number]  Referring provider: Julian Borges MD  Dx:   Encounter Diagnosis     ICD-10-CM    1  Lumbar radiculopathy M54 16    2  Left knee pain, unspecified chronicity M25 562    3  S/P TKR (total knee replacement), left Z96 652    4  Bilateral low back pain with sciatica, sciatica laterality unspecified, unspecified chronicity M54 40        Start Time: 1115  Stop Time: 1205  Total time in clinic (min): 50 minutes    Subjective: pt notes feeling better today LB pain 2/10 and L knee pain 3/10  She notes she has been sleeping a lot lately  Objective: See treatment diary below      Assessment: Tolerated treatment fairly well  Pt showed improved balance today w/ ex's  Overall weakness in B LE's and core  Pt consistently shows up late to PT, today program was modified  Patient demonstrated fatigue post treatment and would benefit from continued PT      Plan: Continue per plan of care        Precautions: Left partial knee TKR, OA, CA      Exercise Diary  11/25 11/27 12/9 12/12 12/18 12/20 1/3/20 1/6/20 1/9    Water walking 5 10 10 12 12 12 12 12 10    Postural training             Gait training 5 5 5          Home exercise pgm/patient education             Wall: t/h raises 1 1 1 1 1 1 1 1 1    Hip abd/add 1 1 1 2 2 2 2 2 2    Marching 1 1 1 1 1 1 1 1 1    squats 1 1 1 1 1 1 1 1 1    Knee flex/ext  1 1 1 1 1 1 1 1    Step-ups (fwd/bkwd/ss)             SLS (eyes open/closed) 2 2  2 2 2 2 2 2    SLS w UE mvmt  AROM/ball toss             Rhythmic stab  5'           UE Noodle work x 4              UE AROM             Resistive UE work (paddles, bells, TB)             Core work on noodle (sitting/stdg)  5'           Sit on noodle with movement             Manual stretches   10          Ankle df/pf 1 2 1 1 1 1 1 1 1    marching 1 2 2 2 2 2 2 2 1    Hip Ab/add 1 2 1 1 1 1 1 1 1    Knee flex/ext 1 2 2 2 2 1 1 1 1    Deep water mvmt 5 5 5 5' 5 5 5    Deep water tx/stretching 5 10 10 10 10 10 10 man 10 DWTX 5'    Specific self - stretches wall/steps 5 2 2 2 2 2 3 3 1        Modalities   11/27 12/9 12/12 12/18 12/20 1/3/20 1/6/20 1/9    whirlpool 5 10 10 10 10 10 10 10 10

## 2020-01-13 ENCOUNTER — OFFICE VISIT (OUTPATIENT)
Dept: PHYSICAL THERAPY | Age: 75
End: 2020-01-13
Payer: COMMERCIAL

## 2020-01-13 DIAGNOSIS — M54.40 BILATERAL LOW BACK PAIN WITH SCIATICA, SCIATICA LATERALITY UNSPECIFIED, UNSPECIFIED CHRONICITY: ICD-10-CM

## 2020-01-13 DIAGNOSIS — Z96.652 S/P TKR (TOTAL KNEE REPLACEMENT), LEFT: ICD-10-CM

## 2020-01-13 DIAGNOSIS — M54.16 LUMBAR RADICULOPATHY: Primary | ICD-10-CM

## 2020-01-13 DIAGNOSIS — M25.562 LEFT KNEE PAIN, UNSPECIFIED CHRONICITY: ICD-10-CM

## 2020-01-13 PROCEDURE — 97113 AQUATIC THERAPY/EXERCISES: CPT

## 2020-01-13 NOTE — PROGRESS NOTES
Daily Note     Today's date: 2020  Patient name: Perfecto Liang  : 1945  MRN: [id number]  Referring provider: Deric Retana MD  Dx:   Encounter Diagnosis     ICD-10-CM    1  Lumbar radiculopathy M54 16    2  Left knee pain, unspecified chronicity M25 562    3  S/P TKR (total knee replacement), left Z96 652    4  Bilateral low back pain with sciatica, sciatica laterality unspecified, unspecified chronicity M54 40        Start Time: 1305  Stop Time: 1405  Total time in clinic (min): 60 minutes    Subjective: pt notes feeling better today LB pain 2/10 and L knee pain 3/10  She notes she has been sleeping a lot lately  Objective: See treatment diary below      Assessment: Tolerated treatment fairly well today  Pt has poor balance today w/ all ex's  [employer] using a noodle for balance  Pt had episodes of LOB throughout session today  Added UE AROM focusing on core and balance today  Also added step ups for L knee strengthening today  Pt needed wall assistance for new ex's due to LOB  Patient demonstrated fatigue post treatment and would benefit from continued PT      Plan: Continue per plan of care        Precautions: Left partial knee TKR, OA, CA      Exercise Diary  11/25 11/27 12/9 12/12 12/18 12/20 1/3/20 1/6/20 1/9 1/13   Water walking 5 10 10 12 12 12 12 12 10 10   Postural training             Gait training 5 5 5          Home exercise pgm/patient education             Wall: t/h raises 1 1 1 1 1 1 1 1 1 1   Hip abd/add 1 1 1 2 2 2 2 2 2 2   Marching 1 1 1 1 1 1 1 1 1 1   squats 1 1 1 1 1 1 1 1 1 1   Knee flex/ext  1 1 1 1 1 1 1 1 1   Step-ups (fwd/bkwd/ss)          2'   SLS (eyes open/closed) 2 2  2 2 2 2 2 2 2   SLS w UE mvmt  AROM/ball toss             Rhythmic stab  5'           UE Noodle work x 4              UE AROM          5   Resistive UE work (paddles, bells, TB)             Core work on noodle (sitting/stdg)  5'           Sit on noodle with movement             Manual stretches   10 Ankle df/pf 1 2 1 1 1 1 1 1 1    marching 1 2 2 2 2 2 2 2 1 1   Hip Ab/add 1 2 1 1 1 1 1 1 1 1   Knee flex/ext 1 2 2 2 2 1 1 1 1 1   Deep water mvmt   5 5 5 5' 5 5 5 5   Deep water tx/stretching 5 10 10 10 10 10 10 man 10 DWTX 5' DWT  10   Specific self - stretches wall/steps 5 2 2 2 2 2 3 3 1 1       Modalities   11/27 12/9 12/12 12/18 12/20 1/3/20 1/6/20 1/9 1/13   whirlpool 5 10 10 10 10 10 10 10 10 10

## 2020-01-15 ENCOUNTER — EVALUATION (OUTPATIENT)
Dept: PHYSICAL THERAPY | Age: 75
End: 2020-01-15
Payer: COMMERCIAL

## 2020-01-15 DIAGNOSIS — M25.562 LEFT KNEE PAIN, UNSPECIFIED CHRONICITY: ICD-10-CM

## 2020-01-15 DIAGNOSIS — M54.40 BILATERAL LOW BACK PAIN WITH SCIATICA, SCIATICA LATERALITY UNSPECIFIED, UNSPECIFIED CHRONICITY: ICD-10-CM

## 2020-01-15 DIAGNOSIS — Z96.652 S/P TKR (TOTAL KNEE REPLACEMENT), LEFT: ICD-10-CM

## 2020-01-15 DIAGNOSIS — M54.16 LUMBAR RADICULOPATHY: Primary | ICD-10-CM

## 2020-01-15 PROCEDURE — 97113 AQUATIC THERAPY/EXERCISES: CPT | Performed by: PHYSICAL THERAPIST

## 2020-01-15 NOTE — LETTER
2020    Lorna Murray MD  [address] [address]    Patient: Perfecto Liang   YOB: 1945   Date of Visit: 1/15/2020     Encounter Diagnosis     ICD-10-CM    1  Lumbar radiculopathy M54 16    2  Left knee pain, unspecified chronicity M25 562    3  S/P TKR (total knee replacement), left Z96 652    4  Bilateral low back pain with sciatica, sciatica laterality unspecified, unspecified chronicity M54 40        Dear Dr Augustin Perez: Thank you for your recent referral of Perfecto Liang  Please review the attached evaluation summary from [facility] recent visit  Please verify that you agree with the plan of care by signing the attached order  If you have any questions or concerns, please do not hesitate to call  I sincerely appreciate the opportunity to share in the care of one of your patients and hope to have another opportunity to work with you in the near future  Sincerely,    Delta Pepper, PT      Referring Provider:      I certify that I have read the below Plan of Care and certify the need for these services furnished under this plan of treatment while under my care  MD Jhonny Galvan[address]: [phone]          PT Re-Evaluation     Today's date: 1/15/2020  Patient name: Perfecto Liang  : 1945  MRN: [id number]  Referring provider: Deric Retana MD  Dx:   Encounter Diagnosis     ICD-10-CM    1  Lumbar radiculopathy M54 16    2  Left knee pain, unspecified chronicity M25 562    3  S/P TKR (total knee replacement), left Z96 652    4  Bilateral low back pain with sciatica, sciatica laterality unspecified, unspecified chronicity M54 40        Start Time: 1424  Stop Time: 1520  Total time in clinic (min): 56 minutes    Assessment  Assessment details: Patient is making slow steady progress with aquatic therapy and is sleeping better   Patient notes improved ROM and strength to her knee but continues to complain of back pain on steps  Patient has also made some progress with increased ADL's and endurance  Impairments: abnormal gait, abnormal or restricted ROM, activity intolerance, impaired balance, impaired physical strength, lacks appropriate home exercise program, pain with function, weight-bearing intolerance, poor posture  and poor body mechanics  Understanding of Dx/Px/POC: good   Prognosis: fair    Goals  ST-3 WEEKS  1  Decrease pain by 2 points on VAS at its worst MET  2  Increase ROM by > 5 deg in all deficients planes  MET  3  Increase CORE/LE by 1/2 MMT grade in all deficient planes  WORKING TOWARDS    LT-6 WEEKS  1  Patient to be independent with a/iadls and improve balance WORKING TOWARDS  2  Increase functional activities for leisure and home activities to previous LOF  3  Independent with HEP and/or fitness program     Plan  Plan details: Patient will continue with aquatic therapy x 2 weeks  Patient would benefit from: skilled physical therapy  Planned modality interventions: cryotherapy, electrical stimulation/Russian stimulation, thermotherapy: hydrocollator packs and unattended electrical stimulation  Planned therapy interventions: activity modification, behavior modification, body mechanics training, aquatic therapy, flexibility, functional ROM exercises, home exercise program, IADL retraining, joint mobilization, manual therapy, neuromuscular re-education, patient education, postural training, strengthening, stretching, therapeutic activities and therapeutic exercise  Frequency: 2x week (2-3x week)  Duration in weeks: 12  Plan of Care beginning date: 2019  Plan of Care expiration date: 3/27/2020  Treatment plan discussed with: patient        Subjective Evaluation    History of Present Illness  Date of onset: 3/2/2000  Mechanism of injury: Patient complains of left hip and back pain at 6/10 but notes relief to her knee pain   Patient reports some relief with water therapy and will have her knee injected 2020          Recurrent probem    Quality of life: good    Pain  Current pain ratin  At best pain ratin  At worst pain ratin  Quality: squeezing, knife-like and radiating  Relieving factors: medications and relaxation  Aggravating factors: walking, stair climbing, standing and lifting  Progression: worsening    Social Support  Steps to enter house: yes  Stairs in house: yes   Lives in: multiple-level home  Lives with: spouse      Diagnostic Tests  X-ray: abnormal  MRI studies: abnormal    FCE comments: MRI next week scheduledTreatments  Previous treatment: chiropractic, injection treatment and physical therapy  Patient Goals  Patient goals for therapy: decreased pain, improved balance, increased motion, increased strength and independence with ADLs/IADLs          Objective     Static Posture     Thoracic Spine  Hyperkyphosis  Palpation   Left   Hypertonic in the erector spinae and lumbar paraspinals  Right   Hypertonic in the erector spinae and lumbar paraspinals  Tenderness     Left Hip   Tenderness in the PSIS  Active Range of Motion     Lumbar   Flexion: 85 degrees   Extension: 25 degrees   Left lateral flexion: 30 degrees       Right lateral flexion: 30 degrees     Strength/Myotome Testing     Left Hip   Planes of Motion   Flexion: 4  Extension: 4-  Abduction: 4-  Adduction: 4    Right Hip   Planes of Motion   Flexion: 4  Extension: 4  Abduction: 4  Adduction: 4    Left Knee   Flexion: 4  Extension: 4-    Right Knee   Flexion: 4+  Extension: 4    Left Ankle/Foot   Dorsiflexion: 3+  Plantar flexion: 4-    Right Ankle/Foot   Dorsiflexion: 4  Plantar flexion: 4    Additional Strength Details  Core is 3+/5    Tests     Lumbar     Left   Negative passive SLR  Right   Negative passive SLR  Left Hip   Positive FADIR  Negative CHANDLER       Ambulation     Observational Gait   Gait: antalgic, asymmetric and crouched Decreased walking speed and stride length       Additional Observational Gait Details  Mild left drop foot    Functional Assessment        Single Leg Stance   Left: 1 seconds  Right: 3 seconds          Precautions: Left partial knee TKR, OA, CA      Exercise Diary  1/15 11/27 12/9 12/12 12/18 12/20 1/3/20 1/6/20 1/9 1/13   Water walking 10 10 10 12 12 12 12 12 10 10   Postural training             Gait training 5 5 5          Home exercise pgm/patient education             Wall: t/h raises 1 1 1 1 1 1 1 1 1 1   Hip abd/add 1 1 1 2 2 2 2 2 2 2   Marching 1 1 1 1 1 1 1 1 1 1   squats 1 1 1 1 1 1 1 1 1 1   Knee flex/ext  1 1 1 1 1 1 1 1 1   Step-ups (fwd/bkwd/ss)          2'   SLS (eyes open/closed) 2 2  2 2 2 2 2 2 2   SLS w UE mvmt  AROM/ball toss             Rhythmic stab 5' 5'           UE Noodle work x 4              UE AROM 5'         5   Resistive UE work (paddles, bells, TB)             Core work on noodle (sitting/stdg) 5 5'           Sit on noodle with movement             Manual stretches   10          Ankle df/pf 1 2 1 1 1 1 1 1 1    marching 1 2 2 2 2 2 2 2 1 1   Hip Ab/add 1 2 1 1 1 1 1 1 1 1   Knee flex/ext 1 2 2 2 2 1 1 1 1 1   Deep water mvmt   5 5 5 5' 5 5 5 5   Deep water tx/stretching 10 10 10 10 10 10 10 man 10 DWTX 5' DWT  10   Specific self - stretches wall/steps 5 2 2 2 2 2 3 3 1 1       Modalities  1/15 11/27 12/9 12/12 12/18 12/20 1/3/20 1/6/20 1/9 1/13   whirlpool 10 10 10 10 10 10 10 10 10 10

## 2020-01-16 ENCOUNTER — TRANSCRIBE ORDERS (OUTPATIENT)
Dept: PHYSICAL THERAPY | Age: 75
End: 2020-01-16

## 2020-01-16 DIAGNOSIS — M54.16 LUMBAR RADICULOPATHY: Primary | ICD-10-CM

## 2020-01-16 DIAGNOSIS — M25.562 LEFT KNEE PAIN, UNSPECIFIED CHRONICITY: ICD-10-CM

## 2020-01-16 DIAGNOSIS — M54.40 BILATERAL LOW BACK PAIN WITH SCIATICA, SCIATICA LATERALITY UNSPECIFIED, UNSPECIFIED CHRONICITY: ICD-10-CM

## 2020-01-16 DIAGNOSIS — Z96.652 S/P TKR (TOTAL KNEE REPLACEMENT), LEFT: ICD-10-CM

## 2020-01-16 NOTE — PROGRESS NOTES
PT Re-Evaluation     Today's date: 1/15/2020  Patient name: Salazar Salcedo  : 1945  MRN: [id number]  Referring provider: Debra Tolbert MD  Dx:   Encounter Diagnosis     ICD-10-CM    1  Lumbar radiculopathy M54 16    2  Left knee pain, unspecified chronicity M25 562    3  S/P TKR (total knee replacement), left Z96 652    4  Bilateral low back pain with sciatica, sciatica laterality unspecified, unspecified chronicity M54 40        Start Time: 1424  Stop Time: 1520  Total time in clinic (min): 56 minutes    Assessment  Assessment details: Patient is making slow steady progress with aquatic therapy and is sleeping better  Patient notes improved ROM and strength to her knee but continues to complain of back pain on steps  Patient has also made some progress with increased ADL's and endurance  Impairments: abnormal gait, abnormal or restricted ROM, activity intolerance, impaired balance, impaired physical strength, lacks appropriate home exercise program, pain with function, weight-bearing intolerance, poor posture  and poor body mechanics  Understanding of Dx/Px/POC: good   Prognosis: fair    Goals  ST-3 WEEKS  1  Decrease pain by 2 points on VAS at its worst MET  2  Increase ROM by > 5 deg in all deficients planes  MET  3  Increase CORE/LE by 1/2 MMT grade in all deficient planes  WORKING TOWARDS    LT-6 WEEKS  1  Patient to be independent with a/iadls and improve balance WORKING TOWARDS  2  Increase functional activities for leisure and home activities to previous LOF    3  Independent with HEP and/or fitness program     Plan  Plan details: Patient will continue with aquatic therapy x 2 weeks  Patient would benefit from: skilled physical therapy  Planned modality interventions: cryotherapy, electrical stimulation/Russian stimulation, thermotherapy: hydrocollator packs and unattended electrical stimulation  Planned therapy interventions: activity modification, behavior modification, body mechanics training, aquatic therapy, flexibility, functional ROM exercises, home exercise program, IADL retraining, joint mobilization, manual therapy, neuromuscular re-education, patient education, postural training, strengthening, stretching, therapeutic activities and therapeutic exercise  Frequency: 2x week (2-3x week)  Duration in weeks: 12  Plan of Care beginning date: 2019  Plan of Care expiration date: 3/27/2020  Treatment plan discussed with: patient        Subjective Evaluation    History of Present Illness  Date of onset: 3/2/2000  Mechanism of injury: Patient complains of left hip and back pain at 6/10 but notes relief to her knee pain  Patient reports some relief with water therapy and will have her knee injected 2020          Recurrent probem    Quality of life: good    Pain  Current pain ratin  At best pain ratin  At worst pain ratin  Quality: squeezing, knife-like and radiating  Relieving factors: medications and relaxation  Aggravating factors: walking, stair climbing, standing and lifting  Progression: worsening    Social Support  Steps to enter house: yes  Stairs in house: yes   Lives in: multiple-level home  Lives with: spouse      Diagnostic Tests  X-ray: abnormal  MRI studies: abnormal    FCE comments: MRI next week scheduledTreatments  Previous treatment: chiropractic, injection treatment and physical therapy  Patient Goals  Patient goals for therapy: decreased pain, improved balance, increased motion, increased strength and independence with ADLs/IADLs          Objective     Static Posture     Thoracic Spine  Hyperkyphosis  Palpation   Left   Hypertonic in the erector spinae and lumbar paraspinals  Right   Hypertonic in the erector spinae and lumbar paraspinals  Tenderness     Left Hip   Tenderness in the PSIS       Active Range of Motion     Lumbar   Flexion: 85 degrees   Extension: 25 degrees   Left lateral flexion: 30 degrees       Right lateral flexion: 30 degrees Strength/Myotome Testing     Left Hip   Planes of Motion   Flexion: 4  Extension: 4-  Abduction: 4-  Adduction: 4    Right Hip   Planes of Motion   Flexion: 4  Extension: 4  Abduction: 4  Adduction: 4    Left Knee   Flexion: 4  Extension: 4-    Right Knee   Flexion: 4+  Extension: 4    Left Ankle/Foot   Dorsiflexion: 3+  Plantar flexion: 4-    Right Ankle/Foot   Dorsiflexion: 4  Plantar flexion: 4    Additional Strength Details  Core is 3+/5    Tests     Lumbar     Left   Negative passive SLR  Right   Negative passive SLR  Left Hip   Positive FADIR  Negative CHANDLER  Ambulation     Observational Gait   Gait: antalgic, asymmetric and crouched   Decreased walking speed and stride length       Additional Observational Gait Details  Mild left drop foot    Functional Assessment        Single Leg Stance   Left: 1 seconds  Right: 3 seconds          Precautions: Left partial knee TKR, OA, CA      Exercise Diary  1/15 11/27 12/9 12/12 12/18 12/20 1/3/20 1/6/20 1/9 1/13   Water walking 10 10 10 12 12 12 12 12 10 10   Postural training             Gait training 5 5 5          Home exercise pgm/patient education             Wall: t/h raises 1 1 1 1 1 1 1 1 1 1   Hip abd/add 1 1 1 2 2 2 2 2 2 2   Marching 1 1 1 1 1 1 1 1 1 1   squats 1 1 1 1 1 1 1 1 1 1   Knee flex/ext  1 1 1 1 1 1 1 1 1   Step-ups (fwd/bkwd/ss)          2'   SLS (eyes open/closed) 2 2  2 2 2 2 2 2 2   SLS w UE mvmt  AROM/ball toss             Rhythmic stab 5' 5'           UE Noodle work x 4              UE AROM 5'         5   Resistive UE work (paddles, bells, TB)             Core work on noodle (sitting/stdg) 5 5'           Sit on noodle with movement             Manual stretches   10          Ankle df/pf 1 2 1 1 1 1 1 1 1    marching 1 2 2 2 2 2 2 2 1 1   Hip Ab/add 1 2 1 1 1 1 1 1 1 1   Knee flex/ext 1 2 2 2 2 1 1 1 1 1   Deep water mvmt   5 5 5 5' 5 5 5 5   Deep water tx/stretching 10 10 10 10 10 10 10 man 10 DWTX 5' DWT  10   Specific self - stretches wall/steps 5 2 2 2 2 2 3 3 1 1       Modalities  1/15 11/27 12/9 12/12 12/18 12/20 1/3/20 1/6/20 1/9 1/13   whirlpool 10 10 10 10 10 10 10 10 10 10

## 2020-01-22 ENCOUNTER — OFFICE VISIT (OUTPATIENT)
Dept: PHYSICAL THERAPY | Age: 75
End: 2020-01-22
Payer: COMMERCIAL

## 2020-01-22 DIAGNOSIS — Z96.652 S/P TKR (TOTAL KNEE REPLACEMENT), LEFT: ICD-10-CM

## 2020-01-22 DIAGNOSIS — M25.562 LEFT KNEE PAIN, UNSPECIFIED CHRONICITY: ICD-10-CM

## 2020-01-22 DIAGNOSIS — M54.40 BILATERAL LOW BACK PAIN WITH SCIATICA, SCIATICA LATERALITY UNSPECIFIED, UNSPECIFIED CHRONICITY: ICD-10-CM

## 2020-01-22 DIAGNOSIS — M54.16 LUMBAR RADICULOPATHY: Primary | ICD-10-CM

## 2020-01-22 PROCEDURE — 97113 AQUATIC THERAPY/EXERCISES: CPT

## 2020-01-22 NOTE — PROGRESS NOTES
Daily Note     Today's date: 2020  Patient name: Jim Bryson  : 1945  MRN: [id number]  Referring provider: Ruby Contreras MD  Dx:   Encounter Diagnosis     ICD-10-CM    1  Lumbar radiculopathy M54 16    2  Left knee pain, unspecified chronicity M25 562    3  S/P TKR (total knee replacement), left Z96 652    4  Bilateral low back pain with sciatica, sciatica laterality unspecified, unspecified chronicity M54 40        Start Time: 1300  Stop Time: 1405  Total time in clinic (min): 65 minutes    Subjective: pt notes her LB is doing better, minimal pain /10 but pain in L knee /10 today  She notes she slept all day again and stopped taking her muscle relaxer because she thinks that is what is making her so tired  Objective: See treatment diary below      Assessment: Tolerated treatment fair  Good movements in the water but pt continues w/ sig poor balance  Pt has difficulty maintaining her balance when using UE's or during [employer]  Pt uses a noodle to help w/ her balance when she is in the water  SLS pt holds on w/ both UE's but  Does let go for a second or two before having to reset  R knee continues to have fluid filled sac to medial knee  manual stretches to QL In supine in the water  Good relief and positive response to manual stretches w/ decreased pain after  Patient demonstrated fatigue post treatment and would benefit from continued PT      Plan: Continue per plan of care        Precautions: Left partial knee TKR, OA, CA    Exercise Diary  1/15 1/22  12/12 12/18 12/20 1/3/20 1/6/20 1/9 1/13   Water walking 10 10 10 12 12 12 12 12 10 10   Postural training             Gait training 5  5          Home exercise pgm/patient education             Wall: t/h raises 1 1 1 1 1 1 1 1 1 1   Hip abd/add 1 2 1 2 2 2 2 2 2 2   Marching 1 1 1 1 1 1 1 1 1 1   squats 1 1 1 1 1 1 1 1 1 1   Knee flex/ext  1 1 1 1 1 1 1 1 1   Step-ups (fwd/bkwd/ss)  3        2'   SLS (eyes open/closed) 2 2  2 2 2 2 2 2 2   SLS w UE mvmt  AROM/ball toss             Rhythmic stab 5'            UE Noodle work x 4   4           UE AROM 5' 5        5   Resistive UE work (paddles, bells, TB)             Core work on noodle (sitting/stdg) 5            Sit on noodle with movement             Manual stretches  15 10          Ankle df/pf 1  1 1 1 1 1 1 1    marching 1  2 2 2 2 2 2 1 1   Hip Ab/add 1  1 1 1 1 1 1 1 1   Knee flex/ext 1  2 2 2 1 1 1 1 1   Deep water mvmt   5 5 5 5' 5 5 5 5   Deep water tx/stretching 10 10 10 10 10 8 10 man 10 DWTX 5' DWT  10   Specific self - stretches wall/steps 5  2 2 2 2 3 3 1 1         Modalities  1/15 1/22  12/12 12/18 12/20 1/3/20 1/6/20 1/9 1/13   whirlpool 10 10 10 10 10 10 10 10 10 10

## 2020-01-24 ENCOUNTER — OFFICE VISIT (OUTPATIENT)
Dept: PHYSICAL THERAPY | Age: 75
End: 2020-01-24
Payer: COMMERCIAL

## 2020-01-24 DIAGNOSIS — M54.40 BILATERAL LOW BACK PAIN WITH SCIATICA, SCIATICA LATERALITY UNSPECIFIED, UNSPECIFIED CHRONICITY: ICD-10-CM

## 2020-01-24 DIAGNOSIS — Z96.652 S/P TKR (TOTAL KNEE REPLACEMENT), LEFT: ICD-10-CM

## 2020-01-24 DIAGNOSIS — M25.562 LEFT KNEE PAIN, UNSPECIFIED CHRONICITY: ICD-10-CM

## 2020-01-24 DIAGNOSIS — M54.16 LUMBAR RADICULOPATHY: Primary | ICD-10-CM

## 2020-01-24 PROCEDURE — 97113 AQUATIC THERAPY/EXERCISES: CPT

## 2020-01-24 NOTE — PROGRESS NOTES
Daily Note     Today's date: 2020  Patient name: Demetra Bee  : 1945  MRN: [id number]  Referring provider: Ofelia Celeste MD  Dx:   Encounter Diagnosis     ICD-10-CM    1  Lumbar radiculopathy M54 16    2  Left knee pain, unspecified chronicity M25 562    3  S/P TKR (total knee replacement), left Z96 652    4  Bilateral low back pain with sciatica, sciatica laterality unspecified, unspecified chronicity M54 40                   Subjective: Patient reports 3/10 pain today in her LB  Objective: See treatment diary below      Assessment: Tolerated treatment well, her balance was better today  Patient exhibited good technique with therapeutic exercises and would benefit from continued PT, patient only has one more visits till dc due to [place]  Patient has a dermatology apt Monday to get a mole removed, and will schedule her last visit next week when cleared to enter the water  Plan: Continue per plan of care        Precautions: Left partial knee TKR, OA, CA    Exercise Diary  1/15 1/22 1/24  12/18 12/20 1/3/20 1/6/20 1/9 1/13   Water walking 10 10 10 12 12 12 12 12 10 10   Postural training             Gait training 5  5          Home exercise pgm/patient education             Wall: t/h raises 1 1 1 1 1 1 1 1 1 1   Hip abd/add 1 2 1 2 2 2 2 2 2 2   Marching 1 1 1 1 1 1 1 1 1 1   squats 1 1 1 1 1 1 1 1 1 1   Knee flex/ext  1 1 1 1 1 1 1 1 1   Step-ups (fwd/bkwd/ss)  3        2'   SLS (eyes open/closed) 2 2 2 2 2 2 2 2 2 2   SLS w UE mvmt  AROM/ball toss             Rhythmic stab 5'            UE Noodle work x 4   4 4          UE AROM 5' 5 5       5   Resistive UE work (paddles, bells, TB)             Core work on noodle (sitting/stdg) 5            Sit on noodle with movement             Manual stretches  15 10          Ankle df/pf 1  1 1 1 1 1 1 1    marching 1  2 2 2 2 2 2 1 1   Hip Ab/add 1  1 1 1 1 1 1 1 1   Knee flex/ext 1  2 2 2 1 1 1 1 1   Deep water mvmt   5 5 5 5' 5 5 5 5   Deep water tx/stretching 10 10 10 10 10 10 10 man 10 DWTX 5' DWT  10   Specific self - stretches wall/steps 5  2 2 2 2 3 3 1 1         Modalities  1/15 1/22 1/245  12/18 12/20 1/3/20 1/6/20 1/9 1/13   whirlpool 10 10 10 10 10 10 10 10 10 10

## 2020-01-27 ENCOUNTER — OFFICE VISIT (OUTPATIENT)
Dept: DERMATOLOGY | Facility: CLINIC | Age: 75
End: 2020-01-27
Payer: COMMERCIAL

## 2020-01-27 VITALS — WEIGHT: 103.8 LBS | HEIGHT: 59 IN | BODY MASS INDEX: 20.92 KG/M2 | TEMPERATURE: 98 F

## 2020-01-27 DIAGNOSIS — L91.8 ACROCHORDON: ICD-10-CM

## 2020-01-27 DIAGNOSIS — D48.9 NEOPLASM OF UNCERTAIN BEHAVIOR: ICD-10-CM

## 2020-01-27 DIAGNOSIS — L82.1 SEBORRHEIC KERATOSIS: Primary | ICD-10-CM

## 2020-01-27 DIAGNOSIS — L57.0 ACTINIC KERATOSIS: ICD-10-CM

## 2020-01-27 DIAGNOSIS — L57.8 ACTINIC SKIN DAMAGE: ICD-10-CM

## 2020-01-27 DIAGNOSIS — L72.0 EPIDERMAL INCLUSION CYST: ICD-10-CM

## 2020-01-27 PROCEDURE — 11102 TANGNTL BX SKIN SINGLE LES: CPT | Performed by: DERMATOLOGY

## 2020-01-27 PROCEDURE — 88305 TISSUE EXAM BY PATHOLOGIST: CPT | Performed by: STUDENT IN AN ORGANIZED HEALTH CARE EDUCATION/TRAINING PROGRAM

## 2020-01-27 PROCEDURE — 17003 DESTRUCT PREMALG LES 2-14: CPT | Performed by: DERMATOLOGY

## 2020-01-27 PROCEDURE — 99203 OFFICE O/P NEW LOW 30 MIN: CPT | Performed by: DERMATOLOGY

## 2020-01-27 PROCEDURE — 17000 DESTRUCT PREMALG LESION: CPT | Performed by: DERMATOLOGY

## 2020-01-27 RX ORDER — ALPRAZOLAM 0.5 MG/1
TABLET ORAL
COMMUNITY
End: 2021-03-23

## 2020-01-27 RX ORDER — FEXOFENADINE HYDROCHLORIDE 60 MG/1
60 TABLET, FILM COATED ORAL DAILY
COMMUNITY
End: 2021-03-23

## 2020-01-27 RX ORDER — METHYLPREDNISOLONE 4 MG/1
TABLET ORAL
COMMUNITY
End: 2021-11-27 | Stop reason: HOSPADM

## 2020-01-27 RX ORDER — ZINC SULFATE CAP 220 MG (50 MG ELEMENTAL ZN) 220 (50 ZN) MG
CAP ORAL
COMMUNITY

## 2020-01-27 RX ORDER — FLUTICASONE PROPIONATE 50 MCG
SPRAY, SUSPENSION (ML) NASAL
COMMUNITY

## 2020-01-27 RX ORDER — TAMOXIFEN CITRATE 20 MG/1
TABLET ORAL
COMMUNITY
Start: 2019-10-02

## 2020-01-27 RX ORDER — DOXYCYCLINE HYCLATE 100 MG
TABLET ORAL
COMMUNITY
End: 2021-03-23

## 2020-01-27 RX ORDER — BUPROPION HYDROCHLORIDE 150 MG/1
TABLET ORAL
COMMUNITY
End: 2021-03-23

## 2020-01-27 RX ORDER — TIZANIDINE 4 MG/1
TABLET ORAL
COMMUNITY
End: 2021-03-23

## 2020-01-27 RX ORDER — LORAZEPAM 1 MG/1
TABLET ORAL
COMMUNITY
End: 2021-03-23

## 2020-01-27 RX ORDER — MORPHINE SULFATE 15 MG/1
TABLET ORAL
COMMUNITY
End: 2021-03-23

## 2020-01-27 RX ORDER — DIAZEPAM 5 MG/1
TABLET ORAL
COMMUNITY
End: 2021-03-23

## 2020-01-27 RX ORDER — METHYLPREDNISOLONE 4 MG/1
TABLET ORAL
COMMUNITY
Start: 2019-10-14 | End: 2021-11-27 | Stop reason: HOSPADM

## 2020-01-27 RX ORDER — CELECOXIB 200 MG/1
CAPSULE ORAL
COMMUNITY
Start: 2020-01-21

## 2020-01-27 RX ORDER — HYDROMORPHONE HYDROCHLORIDE 2 MG/1
2 TABLET ORAL 2 TIMES DAILY PRN
Refills: 0 | COMMUNITY
Start: 2019-12-16 | End: 2021-03-23

## 2020-01-27 RX ORDER — NALOXONE HYDROCHLORIDE 4 MG/.1ML
SPRAY NASAL
COMMUNITY
Start: 2019-09-26

## 2020-01-27 RX ORDER — ASPIRIN 81 MG/1
TABLET, CHEWABLE ORAL DAILY
COMMUNITY
End: 2021-03-23

## 2020-01-27 RX ORDER — LEVOFLOXACIN 500 MG/1
TABLET, FILM COATED ORAL
COMMUNITY
End: 2021-03-23

## 2020-01-27 NOTE — PATIENT INSTRUCTIONS
May put Vaseline on cryotherapy spots as needed  Change dressing on left shoulder daily  Vaseline and band aid      INFORMED CONSENT DISCUSSION AND POST-OPERATIVE INSTRUCTIONS FOR PATIENT    I   RATIONALE FOR PROCEDURE  I understand that a skin biopsy allows the Dermatologist to test a lesion or rash under the microscope to obtain a diagnosis  It usually involves numbing the area with numbing medication and removing a small piece of skin; sometimes the area will be closed with sutures  In this specific procedure, sutures are not usually needed  If any sutures are placed, then they are usually need to be removed in 2 weeks or less  I understand that my Dermatologist recommends that a skin "shave" biopsy be performed today  A local anesthetic, similar to the kind that a dentist uses when filling a cavity, will be injected with a very small needle into the skin area to be sampled  The injected skin and tissue underneath "will go to sleep and become numb so no pain should be felt afterwards  An instrument shaped like a tiny "razor blade" (shave biopsy instrument) will be used to cut a small piece of tissue and skin from the area so that a sample of tissue can be taken and examined more closely under the microscope  A slight amount of bleeding will occur, but it will be stopped with direct pressure and a pressure bandage and any other appropriate methods  I understands that a scar will form where the wound was created  Surgical ointment will be applied to help protect the wound  Sutures are not usually needed      II   RISKS AND POTENTIAL COMPLICATIONS   I understand the risks and potential complications of a skin biopsy include but are not limited to the following:   Bleeding   Infection   Pain   Scar/keloid   Skin discoloration   Incomplete Removal   Recurrence   Nerve Damage/Numbness/Loss of Function   Allergic Reaction to Anesthesia   Biopsies are diagnostic procedures and based on findings additional treatment or evaluation may be required   Loss or destruction of specimen resulting in no additional findings    My Dermatologist has explained to me the nature of the condition, the nature of the procedure, and the benefits to be reasonably expected compared with alternative approaches  My Dermatologist has discussed the likelihood of major risks or complications of this procedure including the specific risks listed above, such as bleeding, infection, and scarring/keloid  I understand that a scar is expected after this procedure  I understand that my physician cannot predict if the scar will form a "keloid," which extends beyond the borders of the wound that is created  A keloid is a thick, painful, and bumpy scar  A keloid can be difficult to treat, as it does not always respond well to therapy, which includes injecting cortisone directly into the keloid every few weeks  While this usually reduces the pain and size of the scar, it does not eliminate it  I understand that photographs may be taken before and after the procedure  These will be maintained as part of the medical providers confidential records and may not be made available to me  I further authorize the medical provider to use the photographs for teaching purposes or to illustrate scientific papers, books, or lectures if in his/her judgment, medical research, education, or science may benefit from its use  I have had an opportunity to fully inquire about the risks and benefits of this procedure and its alternatives  I have been given ample time and opportunity to ask questions and to seek a second opinion if I wished to do so  I acknowledge that there have specifically been no guarantees as to the cosmetic results from the procedure  I am aware that with any procedure there is always the possibility of an unexpected complication  III   POST-PROCEDURAL CARE (WHAT YOU WILL NEED TO DO "AFTER THE BIOPSY" TO OPTIMIZE HEALING)     Keep the area clean and dry  Try NOT to remove the bandage or get it wet for the first 24 hours   Gently clean the area and apply surgical ointment (such as Vaseline petrolatum ointment, which is available "over the counter" and not a prescription) to the biopsy site for up to 2 weeks straight  This acts to protect the wound from the outside world   Sutures are not usually placed in this procedure  If any sutures were placed, return for suture removal as instructed (generally 1 week for the face, 2 weeks for the body)   Take Acetaminophen (Tylenol) for discomfort, if no contraindications  Ibuprofen or aspirin could make bleeding worse   Call our office immediately for signs of infection: fever, chills, increased redness, warmth, tenderness, discomfort/pain, or pus or foul smell coming from the wound  WHAT TO DO IF THERE IS ANY BLEEDING? If a small amount of bleeding is noticed, place a clean cloth over the area and apply firm pressure for ten minutes  Check the wound after 10 minutes of direct pressure  If bleeding persists, try one more time for an additional 10 minutes of direct pressure on the area  If the bleeding becomes heavier or does not stop after the second attempt, or if you have any other questions about this procedure, then please call your [facility] [address]'s Dermatologist by calling [phone] (SKIN)  I hereby acknowledge that I have reviewed and verified the site with my Dermatologist and have requested and authorized my Dermatologist to proceed with the procedure      Assessment and Plan:  Based on a thorough discussion of this condition and the management approach to it (including a comprehensive discussion of the known risks, side effects and potential benefits of treatment), the patient (family) agrees to implement the following specific plan:      Actinic keratoses are very common on sites repeatedly exposed to the sun, especially the backs of the hands and the face, most often affecting the ears, nose, cheeks, upper lip, vermilion of the lower lip, temples, forehead and balding scalp  In severely chronically sun-damaged individuals, they may also be found on the upper trunk, upper and lower limbs, and dorsum of feet  We discussed the theoretical premalignant (pre-cancerous) nature and etiology of these growths  We discussed the prevailing notion that actinic keratoses are a reflection of abnormal skin cell development due to DNA damage by short wavelength UVB  They are more likely to appear if the immune function is poor, due to aging, recent sun exposure, predisposing disease or certain drugs  We discussed that the main concern is that actinic keratoses may predispose to squamous cell carcinoma  It is rare for a solitary actinic keratosis to evolve to squamous cell carcinoma (SCC), but the risk of SCC occurring at some stage in a patient with more than 10 actinic keratoses is thought to be about 10 to 15%  A tender, thickened, ulcerated or enlarging actinic keratosis is suspicious of SCC  Actinic keratoses may be prevented by strict sun protection  If already present, keratoses may improve with a very high sun protection factor (50+) broad-spectrum sunscreen applied at least daily to affected areas, year-round  We recommend that UPF-rated clothing and hats and sunglasses be worn whenever possible and that a sunscreen-moisturizer combination product such as Neutrogena Daily Defense be applied at least three times a day  We performed a thorough discussion of treatment options and specific risk/benefits/alternatives including but not limited to medical field treatment with medications such as the following:     Cryotherapy (specifically, local pain, scarring, dyspigmentation, blistering, possible superinfection, and treats only what we see versus directed treatment today)      Assessment and Plan:  Based on a thorough discussion of this condition and the management approach to it (including a comprehensive discussion of the known risks, side effects and potential benefits of treatment), the patient (family) agrees to implement the following specific plan:   Discussed Cryo therapy     Seborrheic Keratosis  A seborrheic keratosis is a harmless warty spot that appears during adult life as a common sign of skin aging  Seborrheic keratoses can arise on any area of skin, covered or uncovered, with the usual exception of the palms and soles  They do not arise from mucous membranes  Seborrheic keratoses can have highly variable appearance  Seborrheic keratoses are extremely common  It has been estimated that over 90% of adults over the age of 61 years have one or more of them  They occur in males and females of all races, typically beginning to erupt in the 35s or 45s  They are uncommon under the age of 21 years  The precise cause of seborrhoeic keratoses is not known  Seborrhoeic keratoses are considered degenerative in nature  As time goes by, seborrheic keratoses tend to become more numerous  Some people inherit a tendency to develop a very large number of them; some people may have hundreds of them  The name "seborrheic keratosis" is misleading, because these lesions are not limited to a seborrhoeic distribution (scalp, mid-face, chest, upper back), nor are they formed from sebaceous glands, nor are they associated with sebum -- which is greasy    Seborrheic keratosis may also be called "SK," "Seb K," "basal cell papilloma," "senile wart," or "barnacle "      Researchers have noted:   Eruptive seborrhoeic keratoses can follow sunburn or dermatitis   Skin friction may be the reason they appear in body folds   Viral cause (e g , human papillomavirus) seems unlikely   Stable and clonal mutations or activation of FRFR3, PIK3CA, MATIAS, AKT1 and EGFR genes are found in seborrhoeic keratoses   Seborrhoeic keratosis can arise from solar lentigo   FRFR3 mutations also arise in solar lentigines  These mutations are associated with increased age and location on the head and neck, suggesting a role of ultraviolet radiation in these lesions   Seborrheic keratoses do not harbour tumour suppressor gene mutations   Epidermal growth factor receptor inhibitors, which are used to treat some cancers, often result in an increase in verrucal (warty) keratoses  There is no easy way to remove multiple lesions on a single occasion  Unless a specific lesion is "inflamed" and is causing pain or stinging/burning or is bleeding, most insurance companies do not authorize treatment  Assessment and Plan:  Based on a thorough discussion of this condition and the management approach to it (including a comprehensive discussion of the known risks, side effects and potential benefits of treatment), the patient (family) agrees to implement the following specific plan:   Discussed Cryo therapy     Seborrheic Keratosis  A seborrheic keratosis is a harmless warty spot that appears during adult life as a common sign of skin aging  Seborrheic keratoses can arise on any area of skin, covered or uncovered, with the usual exception of the palms and soles  They do not arise from mucous membranes  Seborrheic keratoses can have highly variable appearance  Seborrheic keratoses are extremely common  It has been estimated that over 90% of adults over the age of 61 years have one or more of them  They occur in males and females of all races, typically beginning to erupt in the 35s or 45s  They are uncommon under the age of 21 years  The precise cause of seborrhoeic keratoses is not known  Seborrhoeic keratoses are considered degenerative in nature  As time goes by, seborrheic keratoses tend to become more numerous  Some people inherit a tendency to develop a very large number of them; some people may have hundreds of them      The name "seborrheic keratosis" is misleading, because these lesions are not limited to a seborrhoeic distribution (scalp, mid-face, chest, upper back), nor are they formed from sebaceous glands, nor are they associated with sebum -- which is greasy  Seborrheic keratosis may also be called "SK," "Seb K," "basal cell papilloma," "senile wart," or "barnacle "      Researchers have noted:   Eruptive seborrhoeic keratoses can follow sunburn or dermatitis   Skin friction may be the reason they appear in body folds   Viral cause (e g , human papillomavirus) seems unlikely   Stable and clonal mutations or activation of FRFR3, PIK3CA, MATIAS, AKT1 and EGFR genes are found in seborrhoeic keratoses   Seborrhoeic keratosis can arise from solar lentigo   FRFR3 mutations also arise in solar lentigines  These mutations are associated with increased age and location on the head and neck, suggesting a role of ultraviolet radiation in these lesions   Seborrheic keratoses do not harbour tumour suppressor gene mutations   Epidermal growth factor receptor inhibitors, which are used to treat some cancers, often result in an increase in verrucal (warty) keratoses  There is no easy way to remove multiple lesions on a single occasion  Unless a specific lesion is "inflamed" and is causing pain or stinging/burning or is bleeding, most insurance companies do not authorize treatment  Assessment and Plan:  Based on a thorough discussion of this condition and the management approach to it (including a comprehensive discussion of the known risks, side effects and potential benefits of treatment), the patient (family) agrees to implement the following specific plan:   Benign, no treatment necessary     Skin tags are common, soft, harmless skin lesions that are also called, in the appropriate settings, papillomas, fibroepithelial polyps, and soft fibromas    They are made up of loosely arranged collagen fibers and blood vessels surrounded by a thickened or thinned-out epidermis  Skin tags tend to develop in both men and women as we grow older  They are usually found on the skin folds (neck, armpits, groin)  It is not known what specifically causes skin tags  Certain factors, though, do appear to play a role:   Chaffing and irritation from skin rubbing together   High levels of growth factors (as seen, for example, in pregnancy or in acromegaly/gigantism)   Insulin resistance   Human papillomavirus (wart virus)    We discussed that most skin tags do not need to be treated unless they are specifically causing the patient physical distress or limitation or pose a risk for a larger problem such as an infection that forms secondary to excoriation or chronic irritation  We had a thorough discussion of treatment options and specific risks (including that any procedural treatment may not be covered by insurance and would then be the patient's responsibility) and benefits/alternatives including but not limited to the following:   Cryotherapy (freezing)   Shave removal   Surgical excision (snip excision with scissors)   Electrosurgery   Ligation (we do not do this procedure and counseled against it due to risk of tissue necrosis and infection)    Assessment and Plan:  Based on a thorough discussion of this condition and the management approach to it (including a comprehensive discussion of the known risks, side effects and potential benefits of treatment), the patient (family) agrees to implement the following specific plan:   Benign, no treatment necessary   Do not pop or squeeze   If becomes bothersome, schedule excision  What are epidermal inclusion cysts? Epidermal inclusion cysts are the most common, benign cutaneous cysts   There are many different names for epidermal inclusion cysts, including epidermoid cyst, epidermal cyst, infundibular cyst, inclusion cyst, and keratin cyst  These cysts can occur anywhere on the body and typically present as nodules directly underneath the skin  There is often a visible pore or opening in the center  The cysts are freely moveable and can range from a few millimeters to several centimeters in diameter  The center of epidermoid cysts almost always contains keratin, which has a cheesy appearance, and not sebum  They also do not originate from sebaceous glands  Therefore, epidermal inclusion cysts are not the same as sebaceous cysts  Cysts may remain stable or progressively enlarge over time  There are no reliable predictive factors to tell if an epidermal inclusion cyst will enlarge, become inflamed, or remain quiescent  Infected cysts tend to become larger, turn red, and are more noticeable to the patient  There may be accompanying pain and discomfort  What causes epidermal inclusion cysts? Epidermal inclusion cysts often appear out of the blue and are not contagious  They are due to a proliferation of epidermal cells within the dermis and are more common in men than women  They occur more frequently in patients in their 20s to 45s  Epidermal inclusion cysts by themselves are usually not inherited, but they can be hereditary in rare syndromes such as Hernandez syndrome, nodular elastosis with cysts and comedones (Favre-Racouchot syndrome), and basal cell nevus syndrome (Gorlin syndrome)  Elderly patients with chronic sun-damaged skin areas have a higher likelihood of developing epidermoid cysts  They often occur in areas where hair follicles have been inflamed or repeatedly irritated are more frequent in patients with acne vulgaris  In the  period, they are called milia  Patients on BRAF inhibitors such as imiquimod and cyclosporine have a higher incidence of epidermoid cysts of the face      How do we diagnose an epidermal inclusion cyst?  Epidermoid inclusion cysts are often diagnosed by history and physical exam  There is usually no need for biopsy prior to removal   Radiographic and laboratory exams, such as ultrasound studies, are unnecessary and not typically ordered unless the practitioner suspects a genetic condition  What is the treatment for an epidermal inclusion cyst?  Inflamed, uninfected epidermal inclusion cysts rarely resolve spontaneously without therapy or surgical intervention  Treatment is not emergent unless desired by the patient  Definitive treatment is via surgical excision with walls intact  This method will prevent recurrence  This is best done when the cyst is not inflamed, to decrease the probability of rupture during surgery  - A local anesthetic will be injected around the cyst  - A small incision is made in the skin overlying the cyst, and contents are expressed  - The incision is repaired with sutures    Another option is to use a 4mm punch biopsy with cyst extraction through the defect  Incision and drainage is often needed if the cyst is infected or inflamed  If there is surrounding cellulitis, oral antibiotic therapy may be necessary  The common agents used target methicillin sensitive Staphylococcal aureus and methicillin resistant S aureus in areas of high prevalence  Assessment and Plan:  Based on a thorough discussion of this condition and the management approach to it (including a comprehensive discussion of the known risks, side effects and potential benefits of treatment), the patient (family) agrees to implement the following specific plan:   Start using a daily Mositurizing CREAM 2-3 times a day  Use moisturizer like Eucerin,Cerave or Aveeno Cream 3 times a day for the dry skin           Keep humidifier running    Photo-aging and actinic damage of skin is common on sites repeatedly exposed to the sun, especially the backs of the hands and the face, most often affecting the ears, nose, cheeks, upper lip, vermilion of the lower lip, temples, forehead and balding scalp   In severely chronically sun-exposed individuals, this condition may also be found on the upper trunk, upper and lower limbs, and dorsum of feet  Photo-aging induces cutaneous changes that vary among individuals, reflecting inherent differences in vulnerability to sun exposure and repair capacity  We discussed further steps to minimize or avoid UV exposure:     Be aware of daily UV index levels  In the [facility], this index is often reported on the [address]   Avoid outdoor activities during the middle of the day   Wear sun-protective clothing (e g , UPF-rated, broad-brimmed hats, long sleeves, and trousers or skirts)   Apply a high sun protection factor (60+) broad-spectrum sunscreen moisturizer at least three times a day to affected areas, year-round  I recommended Neutrogena Daily Defense or CeraVe AM or Aveeno   Do not smoke, and where possible, avoid exposure to pollutants   Get plenty of exercise -- active people appear younger than inactive people   Eat fruit and vegetables daily   Many oral supplements with antioxidant and anti-inflammatory properties have been advocated to mitigate skin aging and to improve skin health  These include carotenoids; polyphenols; chlorophyll; aloe vera; vitamins B, C, and E; red ginseng; squalene; and omega-3 fatty acids  Their role in combatting skin aging is unclear

## 2020-01-27 NOTE — PROGRESS NOTES
[address] [facility] Note     Patient Name: Nia Lopez  Encounter Date: 1/27/20    Today's Chief Concerns:  Y[place] Story Concern #1:  Skin Lesion    Past Medical History:  Have you ever had or currently have any of the following medical conditions or treatments? · HIV/AIDS: No  · Hepatitis B: No  · Hepatitis C: No   · Diabetes: No  · Tuberculosis: No  · Biologic Therapy/Chemotherapy: No  · Organ or Bone Marrow Transplantation: No  · Radiation Treatment: No  · Cancer (If Yes, which types)- No      Have you ever had any of the following skin conditions? · Melanoma? (If Yes, please provide more detail)- No  · Basal Cell Carcinoma: No  · Squamous Cell Carcinoma: No  · Sebaceous Cell Carcinoma: No  · Merkel Cell Carcinoma: No  · Angiosarcoma: No  · Blistering Sunburns: No  · Eczema: No  · Psoriasis: No    Social History:    What is your current Smoking Status? Non smoker    What is/was your primary occupation? retired    What are your hobbies/past-times? Walking    Family history:  Do any of your "first degree relatives" (parent, brother, sister, or child) have any of the following conditions? · Melanoma? (If Yes, which relatives?) No  · Eczema: No  · Asthma: No  · Hay Fever/Seasonal Allergies: No  · Psoriasis: No  · Arthritis: YES  · Thyroid Problems: No  · Lupus/Connective Tissue Disease: No  · Diabetes: No  · Stroke: No  · Blood Clots: No  · IBD/Crohn's/Ulcerative Colitis: No  · Vitiligo: No  · Scarring/Keloids: No  · Severe Acne: No  · Pancreatic Cancer: No  · Other known Skin Condition? If Yes, what condition and which relatives?   YES, Mother and father had Basal Cell Carcinoma, father also had Squamous Cell Carcinoma    Current Medications:    Current Outpatient Medications:     ALPRAZolam (XANAX) 0 5 mg tablet, alprazolam 0 5 mg tablet, Disp: , Rfl:     aspirin 81 mg chewable tablet, Daily, Disp: , Rfl:     BACLOFEN PO, baclofen (bulk), Disp: , Rfl:     buPROPion (WELLBUTRIN XL) 150 mg 24 hr tablet, bupropion HCl  mg 24 hr tablet, extended release, Disp: , Rfl:     celecoxib (CeleBREX) 200 mg capsule, TAKE 1 CAPSULE BY MOUTH EVERY DAY, Disp: , Rfl:     CHOLECALCIFEROL-VITAMIN C PO, Take 1,000 Units by mouth daily, Disp: , Rfl:     diazepam (VALIUM) 5 mg tablet, diazepam 5 mg tablet, Disp: , Rfl:     doxycycline hyclate (VIBRA-TABS) 100 mg tablet, doxycycline hyclate 100 mg tablet, Disp: , Rfl:     fexofenadine (ALLEGRA) 60 MG tablet, Take 60 mg by mouth daily, Disp: , Rfl:     fluticasone (FLONASE) 50 mcg/act nasal spray, fluticasone propionate 50 mcg/actuation nasal spray,suspension, Disp: , Rfl:     HYDROmorphone (DILAUDID) 2 mg tablet, Take 2 mg by mouth 2 (two) times a day as needed, Disp: , Rfl: 0    levofloxacin (LEVAQUIN) 500 mg tablet, levofloxacin 500 mg tablet, Disp: , Rfl:     LORazepam (ATIVAN) 1 mg tablet, lorazepam 1 mg tablet, Disp: , Rfl:     methylprednisolone (MEDROL) 4 mg tablet, methylprednisolone 4 mg tablets in a dose pack, Disp: , Rfl:     methylPREDNISolone 4 MG tablet therapy pack, , Disp: , Rfl:     morphine (MSIR) 15 mg tablet, morphine 15 mg immediate release tablet, Disp: , Rfl:     Naloxone HCl (NARCAN) 4 MG/0 1ML LIQD, Narcan 4 mg/actuation nasal spray, Disp: , Rfl:     NON FORMULARY, COMPOUNDED MEDICATION  DICLOFENAC 2%, GABAPENTIN5%, LIDOCAINE3%, PENTOXIFYLLINE2%  APPLY 1-2 GRAMS TO AFFECTED AREA 3-4 TIMES DAILY, Disp: , Rfl:     oxyCODONE HCl 5 MG TABA, oxycodone 5 mg tablet, Disp: , Rfl:     pravastatin (PRAVACHOL) 20 mg tablet, Take 20 mg by mouth daily, Disp: , Rfl:     tamoxifen (NOLVADEX) 20 mg tablet, tamoxifen 20 mg tablet, Disp: , Rfl:     tiZANidine (ZANAFLEX) 4 mg tablet, tizanidine 4 mg tablet, Disp: , Rfl:     zinc sulfate 50 mg capsule, Take by mouth, Disp: , Rfl:     Specific Alerts:    Have you been seen by a [facility]atologist in the last 3 years? No    Are you pregnant or planning to become pregnant?  N/A    Are you currently or planning to be nursing or breast feeding? N/A    Allergies   Allergen Reactions    Cefuroxime Anaphylaxis    Penicillins Anaphylaxis     Other reaction(s): Shock ( Severe,Abrupt Hypotension)    Cephalosporins      Other reaction(s): Hypotension  as per pt verbalization preoperatively    Hydromorphone Itching       May we call your Preferred Phone number to discuss your specific medical information? YES    May we leave a detailed message that includes your specific medical information? YES    Have you traveled outside of the [facility] in the past 3 months? No    Do you currently have a pacemaker or defibrillator? No    Do you have any artificial heart valves, joints, plates, screws, rods, stents, pins, etc? YES   - If Yes, were any placed within the last 2 years? End of September    Do you require any medications prior to a surgical procedure? No   - If Yes, for which procedure? - If Yes, what medications to you require? Are you taking any medications that cause you to bleed more easily ("blood thinners") YES    Have you ever experienced a rapid heartbeat with epinephrine? No    Have you ever been treated with "gold" (gold sodium thiomalate) therapy? No    [facility] can help with wrinkles, "laugh lines," facial volume loss, "double chin," "love handles," age spots, and more  Are you interested in learning today about some of the skin enhancement procedures that we offer? (If Yes, please provide more detail) No    Review of Systems:  Have you recently had or currently have any of the following?     · Fever or chills: No  · Night Sweats: No  · Headaches: No  · Weight Gain: No  · Weight Loss: No  · Blurry Vision: No  · Nausea: No  · Vomiting: No  · Diarrhea: No  · Blood in Stool: No  · Abdominal Pain: No  · Itchy Skin: YES  · Painful Joints: YES  · Swollen Joints: No  · Muscle Pain: No  · Irregular Mole: YES  · Sun Burn: No  · Dry Skin: No  · Skin Color Changes: No  · Scar or Keloid: No  · Cold Sores/Fever Blisters: No  · Bacterial Infections/MRSA: No  · Anxiety: No  · Depression: No  · Suicidal or Homicidal Thoughts: No      PHYSICAL EXAM:      Was a chaperone (Derm Clinical Assistant) present for the entirety of the Physical Exam? YES    Did the Dermatology Team specifically ask and  the patient on the importance of a Full Skin Exam to be sure that nothing is missed clinically?  YES    Did the patient request or accept a Full Skin Exam?  YES    Did the patient specifically refuse to have the areas "under-the-bra" examined by the Dermatologist? No    Did the patient specifically refuse to have the areas "under-the-underwear" examined by the Dermatologist? No      CONSTITUTIONAL:   Vitals:    01/27/20 1135   Temp: 98 °F (36 7 °C)   TempSrc: Tympanic   Weight: 47 1 kg (103 lb 12 8 oz)   Height: 4' 11" (1 499 m)       PSYCH: Normal mood and affect  EYES: Normal conjunctiva  ENT: Normal lips and oral mucosa  CARDIOVASCULAR: No edema  RESPIRATORY: Normal respirations      FULL ORGAN SYSTEM SKIN EXAM (SKIN)  Hair, Scalp, Ears, Face Normal except as noted below in Assessment   Neck, Cervical Chain Nodes Normal except as noted below in Assessment   Right Arm/Hand/Fingers Normal except as noted below in Assessment   Left Arm/Hand/Fingers Normal except as noted below in Assessment   Chest/Breasts/Axillae Viewed areas Normal except as noted below in Assessment   Abdomen, Umbilicus Normal except as noted below in Assessment   Back/Spine Normal except as noted below in Assessment   Groin/Genitalia/Buttocks Viewed areas Normal except as noted below in Assessment   Right Leg, Foot, Toes Normal except as noted below in Assessment   Left Leg, Foot, Toes Normal except as noted below in Assessment        ASSESSMENT AND PLAN BY DIAGNOSIS:    History of Present Condition:     Duration:  How long has this been an issue for you?    o  2 months   Location Affected:  Where on the body is this affecting you? o  Left shoulder   Quality:  Is there any bleeding, pain, itch, burning/irritation, or redness associated with the skin lesion?    o  Burning and itching and stinging    Severity:  Describe any bleeding, pain, itch, burning/irritation, or redness on a scale of 1 to 10 (with 10 being the worst)  o  4 when painful   Timing:  Does this condition seem to be there pretty constantly or do you notice it more at specific times throughout the day?    o  When in hot water   Context:  Have you ever noticed that this condition seems to be associated with specific activities you do? o  When exposed to hot water   Modifying Factors:    o Anything that seems to make the condition worse?    -  Itching and hot tub use  o What have you tried to do to make the condition better?    -  Eucerin cream    Associated Signs and Symptoms:  Does this skin lesion seem to be associated with any of the following:  o  DERM ASSOCIATED SIGNS AND SYMPTOMS: Itching and Scratching     1  NEOPLASM OF UNCERTAIN BEHAVIOR OF SKIN    Physical Exam:   (Anatomic Location); (Size and Morphological Description); (Differential Diagnosis):  o Left upper back  0 2 cm x 0 2 cm eroded pink papule with little scale  Differential irritated Seborrheic  Keratosis vs Squamous Cell Carcinoma        Additional History of Present Condition:  Located on left shoulder  Present for 2 months  Patient reports itching    Assessment and Plan:   I have discussed with the patient that a sample of skin via a "skin biopsy would be potentially helpful to further make a specific diagnosis under the microscope   Based on a thorough discussion of this condition and the management approach to it (including a comprehensive discussion of the known risks, side effects and potential benefits of treatment), the patient (family) agrees to implement the following specific plan:    o Procedure:  Skin Biopsy    After a thorough discussion of treatment options and risk/benefits/alternatives (including but not limited to local pain, scarring, dyspigmentation, blistering, possible superinfection, and inability to confirm a diagnosis via histopathology), verbal and written consent were obtained and portion of the rash was biopsied for tissue sample  See below for consent that was obtained from patient and subsequent Procedure Note  PROCEDURE SHAVE BIOPSY NOTE:     Performing Physician: Red Post    Anatomic Location; Clinical Description with size (cm); Pre-Op Diagnosis:   o 0 2 cm x 0 2 cm eroded pink papule with little scale  Differential irritated Seborrheic  Keratosis vs Squamous Cell Carcinoma   Post-op diagnosis: Same      Local anesthesia: 1% xylocaine with epi       Topical anesthesia: None     Hemostasis: Aluminum chloride       After obtaining informed consent  at which time there was a discussion about the purpose of biopsy  and low risks of infection and bleeding  The area was prepped and draped in the usual fashion  Anesthesia was obtained with 1% lidocaine with epinephrine  A shave biopsy to an appropriate sampling depth was obtained with a sterile blade (such as a 15-blade or DermaBlade)  The resulting wound was covered with surgical ointment and bandaged appropriately  The patient tolerated the procedure well without complications and was without signs of functional compromise  Specimen has been sent for review by Dermatopathology  Standard post-procedure care has been explained and has been included in written form within the patient's copy of Informed Consent  INFORMED CONSENT DISCUSSION AND POST-OPERATIVE INSTRUCTIONS FOR PATIENT    I   RATIONALE FOR PROCEDURE  I understand that a skin biopsy allows the Dermatologist to test a lesion or rash under the microscope to obtain a diagnosis  It usually involves numbing the area with numbing medication and removing a small piece of skin; sometimes the area will be closed with sutures   In this specific procedure, sutures are not usually needed  If any sutures are placed, then they are usually need to be removed in 2 weeks or less  I understand that my Dermatologist recommends that a skin "shave" biopsy be performed today  A local anesthetic, similar to the kind that a dentist uses when filling a cavity, will be injected with a very small needle into the skin area to be sampled  The injected skin and tissue underneath "will go to sleep and become numb so no pain should be felt afterwards  An instrument shaped like a tiny "razor blade" (shave biopsy instrument) will be used to cut a small piece of tissue and skin from the area so that a sample of tissue can be taken and examined more closely under the microscope  A slight amount of bleeding will occur, but it will be stopped with direct pressure and a pressure bandage and any other appropriate methods  I understands that a scar will form where the wound was created  Surgical ointment will be applied to help protect the wound  Sutures are not usually needed  II   RISKS AND POTENTIAL COMPLICATIONS   I understand the risks and potential complications of a skin biopsy include but are not limited to the following:   Bleeding   Infection   Pain   Scar/keloid   Skin discoloration   Incomplete Removal   Recurrence   Nerve Damage/Numbness/Loss of Function   Allergic Reaction to Anesthesia   Biopsies are diagnostic procedures and based on findings additional treatment or evaluation may be required   Loss or destruction of specimen resulting in no additional findings    My Dermatologist has explained to me the nature of the condition, the nature of the procedure, and the benefits to be reasonably expected compared with alternative approaches  My Dermatologist has discussed the likelihood of major risks or complications of this procedure including the specific risks listed above, such as bleeding, infection, and scarring/keloid    I understand that a scar is expected after this procedure  I understand that my physician cannot predict if the scar will form a "keloid," which extends beyond the borders of the wound that is created  A keloid is a thick, painful, and bumpy scar  A keloid can be difficult to treat, as it does not always respond well to therapy, which includes injecting cortisone directly into the keloid every few weeks  While this usually reduces the pain and size of the scar, it does not eliminate it  I understand that photographs may be taken before and after the procedure  These will be maintained as part of the medical providers confidential records and may not be made available to me  I further authorize the medical provider to use the photographs for teaching purposes or to illustrate scientific papers, books, or lectures if in his/her judgment, medical research, education, or science may benefit from its use  I have had an opportunity to fully inquire about the risks and benefits of this procedure and its alternatives  I have been given ample time and opportunity to ask questions and to seek a second opinion if I wished to do so  I acknowledge that there have specifically been no guarantees as to the cosmetic results from the procedure  I am aware that with any procedure there is always the possibility of an unexpected complication  III  POST-PROCEDURAL CARE (WHAT YOU WILL NEED TO DO "AFTER THE BIOPSY" TO OPTIMIZE HEALING)     Keep the area clean and dry  Try NOT to remove the bandage or get it wet for the first 24 hours   Gently clean the area and apply surgical ointment (such as Vaseline petrolatum ointment, which is available "over the counter" and not a prescription) to the biopsy site for up to 2 weeks straight  This acts to protect the wound from the outside world   Sutures are not usually placed in this procedure    If any sutures were placed, return for suture removal as instructed (generally 1 week for the face, 2 weeks for the body)   Take Acetaminophen (Tylenol) for discomfort, if no contraindications  Ibuprofen or aspirin could make bleeding worse   Call our office immediately for signs of infection: fever, chills, increased redness, warmth, tenderness, discomfort/pain, or pus or foul smell coming from the wound  WHAT TO DO IF THERE IS ANY BLEEDING? If a small amount of bleeding is noticed, place a clean cloth over the area and apply firm pressure for ten minutes  Check the wound after 10 minutes of direct pressure  If bleeding persists, try one more time for an additional 10 minutes of direct pressure on the area  If the bleeding becomes heavier or does not stop after the second attempt, or if you have any other questions about this procedure, then please call your [facility]ologist by calling [phone] (SKIN)  I hereby acknowledge that I have reviewed and verified the site with my Dermatologist and have requested and authorized my Dermatologist to proceed with the procedure  2  ACTINIC KERATOSIS    Physical Exam:   Anatomic Location Affected:  Trunk and extremities, right eye   Morphological Description:  Scaly pink papules      Assessment and Plan:  Based on a thorough discussion of this condition and the management approach to it (including a comprehensive discussion of the known risks, side effects and potential benefits of treatment), the patient (family) agrees to implement the following specific plan:      Cryotherapy (specifically, local pain, scarring, dyspigmentation, blistering, possible superinfection, and treats only what we see versus directed treatment today)        PROCEDURE:  DESTRUCTION OF PRE-MALIGNANT LESIONS  After a thorough discussion of treatment options and risk/benefits/alternatives (including but not limited to local pain, scarring, dyspigmentation, blistering, and possible superinfection), verbal and written consent were obtained and the aforementioned lesions were treated on with cryotherapy using liquid nitrogen x 2 cycle for 5-10 seconds   TOTAL NUMBER of 9 pre-malignant lesions were treated today on the ANATOMIC LOCATION: 1 inferior to right eye, 4 left arm, 1 left shoulder, and 3 right arm  The patient tolerated the procedure well, and after-care instructions were provided  3  SEBORRHEIC KERATOSIS; NON-INFLAMED    Physical Exam:   Anatomic Location Affected:  Upper extremities   Morphological Description:  Flat and raised, waxy, smooth to warty textured, yellow to brownish-grey to dark brown to blackish, discrete, "stuck-on" appearing papules  Additional History of Present Condition:  Patient reports new bumps on the skin  Denies itch, burn, pain, bleeding or ulceration  Present constantly; nothing seems to make it worse or better  No prior treatment  Assessment and Plan:  Based on a thorough discussion of this condition and the management approach to it (including a comprehensive discussion of the known risks, side effects and potential benefits of treatment), the patient (family) agrees to implement the following specific plan:  Monitor for changes  Benign, reassured          4  ACROCHORDON ("SKIN TAG")    Physical Exam:   Anatomic Location Affected:  Left inframammary    Morphological Description:  Skin colored pedunculated papules       Assessment and Plan:  Based on a thorough discussion of this condition and the management approach to it (including a comprehensive discussion of the known risks, side effects and potential benefits of treatment), the patient (family) agrees to implement the following specific plan:   Benign, no treatment necessary          5  EPIDERMAL INCLUSION CYST    Physical Exam:   Anatomic Location Affected:  Right chest   Morphological Description:  Subcutaneous blue nodule with central punctum       Assessment and Plan:  Based on a thorough discussion of this condition and the management approach to it (including a comprehensive discussion of the known risks, side effects and potential benefits of treatment), the patient (family) agrees to implement the following specific plan:   Benign, no treatment necessary   Do not pop or squeeze   If becomes bothersome, schedule excision           6  ACTINIC DAMAGE (Chronic Ultraviolet Radiation Exposure) and xerosis    Physical Exam:   Anatomic Location Affected:  Trunk and extremities   Morphological Description:  Mottled (hyper- and hypo-pigmented), slightly atrophic skin with overlying telangiectasia    Assessment and Plan:  Based on a thorough discussion of this condition and the management approach to it (including a comprehensive discussion of the known risks, side effects and potential benefits of treatment), the patient (family) agrees to implement the following specific plan:   Start using a daily Mositurizing CREAM 2-3 times a day  Use moisturizer like Eucerin,Cerave or Aveeno Cream 3 times a day for the dry skin           Keep humidifier running  [employer]    I,:   Jermaine Barraza MA am acting as a scribe while in the presence of the attending physician :        I,:   Rosalva Garcia MD personally performed the services described in this documentation    as scribed in my presence :

## 2020-02-03 ENCOUNTER — TELEPHONE (OUTPATIENT)
Dept: OTHER | Facility: OTHER | Age: 75
End: 2020-02-03

## 2020-02-07 NOTE — RESULT ENCOUNTER NOTE
Please call and schedule for [facility] appointment  Will be doing electrodessication and curretage to back lesion

## 2020-02-07 NOTE — RESULT ENCOUNTER NOTE
Left voicemail on home phone  Called mobile number and spoke to   Patient at [address] [place] visit  Will try calling again in 30 minutes

## 2020-02-07 NOTE — RESULT ENCOUNTER NOTE
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  While the histologic differential includes trichilemmoma with atypia and other benign follicular tumors, these diagnoses are less favored given the degree and extent of cytologic atypia  Dulcy Fanny of [place]/Condition:   MALIGNANT    Provider has personally called patient and relayed results and plan:  yes    Plan:  Discussed optoins including 5 fluorouracil topically, electrodessication and currettage, and excision  Plan for [facility]  Not on blood thinners  No pacemaker/defibrillator  No artificial heart valves  Recent joint replacement  She will check with orthopedic surgeon about pre op antibiotics  DCA to call and schedule appointment for procedure  Case Report  Surgical Pathology Report                         Case: [id number]                                   Authorizing Provider: Gary Graham MD            Collected:           01/27/2020 1325              Ordering Location:     [facility] Received:            01/27/2020 1325                                     Gap                                                                          Pathologist:           Sim Edgar MD                                                           Specimen:    Skin, Other, A: Left upper Back                                                          Final Diagnosis     A   Skin, left upper back, shave biopsy:     Atypical endophytic squamous proliferation; extending to the tissue edges (see note)      Note: 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  While the histologic differential includes trichilemmoma with atypia and other benign follicular tumors, these diagnoses are less favored given the degree and extent of cytologic atypia  Electronically signed by Yvonne Mckee MD on 2/3/2020 at  1:20 PM  Additional Information   All controls performed with the immunohistochemical stains reported above reacted appropriately  These tests were developed and their performance characteristics determined by [facility] [address] [place] [address] or [facility]  They may not be cleared or approved by the [place]  Food and Drug Administration  The FDA has determined that such clearance or approval is not necessary  These tests are used for clinical purposes  They should not be regarded as investigational or for research  This laboratory has been approved by [place] [id number], designated as a high-complexity laboratory and is qualified to perform these tests  Gross Description   A  The specimen is received in formalin, labeled with the patient's name and hospital number, and is designated "left upper back"  It consists of an unoriented, 0 5 x 0 4 by less than 0 1 cm irregular portion of pale white, translucent skin displaying a paracentral, roughly well-defined, ovoid, 0 4 x 0 3 cm pale tan, glistening slightly rubbery macular lesion that comes within less than 0 1 cm the nearest margin  The margin is inked green and the skin surface red  The is bisected and entirely submitted between sponges in cassette A1      Note: The estimated total formalin fixation time based upon information provided by the submitting clinician and the standard processing schedule is under 72 hours      [place]     Clinical Information   A: Left upper back; Skin; Shave; 66year old female with a 0 2 cm x 0 2 cm eroded pink papule with little scale  Differential irritated Seborrheic  Keratosis vs Squamous Cell Carcinoma       Attention Dr Alejandro Bean

## 2020-03-02 ENCOUNTER — OFFICE VISIT (OUTPATIENT)
Dept: DERMATOLOGY | Facility: CLINIC | Age: 75
End: 2020-03-02
Payer: COMMERCIAL

## 2020-03-02 VITALS — HEIGHT: 59 IN | WEIGHT: 101.5 LBS | TEMPERATURE: 98.4 F | BODY MASS INDEX: 20.46 KG/M2

## 2020-03-02 DIAGNOSIS — D09.9 SQUAMOUS CELL CARCINOMA IN SITU: Primary | ICD-10-CM

## 2020-03-02 PROCEDURE — 17262 DSTRJ MAL LES T/A/L 1.1-2.0: CPT | Performed by: DERMATOLOGY

## 2020-03-02 NOTE — PATIENT INSTRUCTIONS
DISCUSSION OF TREATMENT AND POSTOP CARE FOR PATIENT    What is curettage and desiccation? Curettage and desiccation is a type of electrosurgery in which a skin lesion is scraped off and heat is applied to the skin surface  What is involved in curettage and cautery? Your doctor will explain to you why your skin lesion needs treatment and the procedure involved  You may have to sign a consent form to indicate that you consent to the surgical procedure  Tell your doctor if you are taking any medication, or if you have any allergies or medical conditions  The doctor will inject some local anaesthetic into the area surrounding the lesion to be treated  This will make the skin go numb so no pain should be felt during the procedure  You may feel a pushing sensation but this should not be painful  The skin lesion is scraped off with a curette, which is like a small spoon with very sharp edges  The lesion should be sent to a pathology laboratory for analysis  The wound surface is then cauterised with a hot wire beaded tip or electrosurgical unit (diathermy)  This stops bleeding and helps destroys any remaining skin tumour cells  This procedure is usually repeated twice for malignant skin lesions (serial curettage and cautery)  A dressing may be applied and instructions should be given on how to care for your wound  What types of skin lesions can be treated by curettage? Curettage is suitable to treat lesions where the material being scraped off is softer than the surrounding skin or when there is a natural cleavage plane between the lesion and the surrounding normal tissue  The following are sometimes treated by curettage:   Squamous cell carcinoma in situ    Actinic keratoses   Basal cell carcinomas that are large, deep or recurrent are usually not suitable for curettage  Lesions with poorly defined edges are also generally unsuitable  Curettage and desiccation is most often used in more superficial type basal cell carcinoma  Will I have a scar? Curettage often results in some sort of scar especially if accompanied by cautery  The scars from curettage are usually flat and round  They are a similar size to that of the original skin lesion  Some people have an abnormal response to skin healing and these people may get larger scars than usual (keloids and hypertrophic scarring)  How do I look after the wound following skin curettage? The wound may be tender when the local anaesthetic wears off   Leave the dressing in place till the nest day  Avoid strenuous exertion and stretching of the area   If there is any bleeding, press on the wound firmly with a folded towel without looking at it for 30 minutes  If it is still bleeding after this time, seek medical attention  Follow instructions a written in our consent ,  The wound from curettage will take approximately 2-3 weeks to heal over  The scar will initially be red and raised but usually reduces in colour and size over several months

## 2020-03-02 NOTE — PROGRESS NOTES
[facility] Follow Up Note    Patient Name: Efren Corona  Encounter Date: 3/2/20    Today's Chief Concerns:  Tiffany Caputo Concern #1:  EDC back    Current Medications:    Current Outpatient Medications:     ALPRAZolam (XANAX) 0 5 mg tablet, alprazolam 0 5 mg tablet, Disp: , Rfl:     aspirin 81 mg chewable tablet, Daily, Disp: , Rfl:     BACLOFEN PO, baclofen (bulk), Disp: , Rfl:     buPROPion (WELLBUTRIN XL) 150 mg 24 hr tablet, bupropion HCl  mg 24 hr tablet, extended release, Disp: , Rfl:     celecoxib (CeleBREX) 200 mg capsule, TAKE 1 CAPSULE BY MOUTH EVERY DAY, Disp: , Rfl:     CHOLECALCIFEROL-VITAMIN C PO, Take 1,000 Units by mouth daily, Disp: , Rfl:     diazepam (VALIUM) 5 mg tablet, diazepam 5 mg tablet, Disp: , Rfl:     doxycycline hyclate (VIBRA-TABS) 100 mg tablet, doxycycline hyclate 100 mg tablet, Disp: , Rfl:     fexofenadine (ALLEGRA) 60 MG tablet, Take 60 mg by mouth daily, Disp: , Rfl:     fluticasone (FLONASE) 50 mcg/act nasal spray, fluticasone propionate 50 mcg/actuation nasal spray,suspension, Disp: , Rfl:     HYDROmorphone (DILAUDID) 2 mg tablet, Take 2 mg by mouth 2 (two) times a day as needed, Disp: , Rfl: 0    levofloxacin (LEVAQUIN) 500 mg tablet, levofloxacin 500 mg tablet, Disp: , Rfl:     LORazepam (ATIVAN) 1 mg tablet, lorazepam 1 mg tablet, Disp: , Rfl:     methylprednisolone (MEDROL) 4 mg tablet, methylprednisolone 4 mg tablets in a dose pack, Disp: , Rfl:     methylPREDNISolone 4 MG tablet therapy pack, , Disp: , Rfl:     morphine (MSIR) 15 mg tablet, morphine 15 mg immediate release tablet, Disp: , Rfl:     Naloxone HCl (NARCAN) 4 MG/0 1ML LIQD, Narcan 4 mg/actuation nasal spray, Disp: , Rfl:     NON FORMULARY, COMPOUNDED MEDICATION  DICLOFENAC 2%, GABAPENTIN5%, LIDOCAINE3%, PENTOXIFYLLINE2%  APPLY 1-2 GRAMS TO AFFECTED AREA 3-4 TIMES DAILY, Disp: , Rfl:     oxyCODONE HCl 5 MG TABA, oxycodone 5 mg tablet, Disp: , Rfl:     pravastatin (PRAVACHOL) 20 mg tablet, Take 20 mg by mouth daily, Disp: , Rfl:     tamoxifen (NOLVADEX) 20 mg tablet, tamoxifen 20 mg tablet, Disp: , Rfl:     tiZANidine (ZANAFLEX) 4 mg tablet, tizanidine 4 mg tablet, Disp: , Rfl:     zinc sulfate 50 mg capsule, Take by mouth, Disp: , Rfl:     CONSTITUTIONAL:   Vitals:    03/02/20 1326   Temp: 98 4 °F (36 9 °C)   TempSrc: Tympanic   Weight: 46 kg (101 lb 8 oz)   Height: 4' 11" (1 499 m)         Specific Alerts:    Have you been seen by a [facility] D[facility]atologist in the last 3 years? YES    Are you pregnant or planning to become pregnant? No    Are you currently or planning to be nursing or breast feeding? No    Allergies   Allergen Reactions    Cefuroxime Anaphylaxis    Penicillins Anaphylaxis     Other reaction(s): Shock ( Severe,Abrupt Hypotension)    Cephalosporins      Other reaction(s): Hypotension  as per pt verbalization preoperatively    Hydromorphone Itching       May we call your Preferred Phone number to discuss your specific medical information? YES    May we leave a detailed message that includes your specific medical information? YES    Have you traveled outside of the [facility] in the past 3 months? No    Do you currently have a pacemaker or defibrillator? No    Do you have any artificial heart valves, joints, plates, screws, rods, stents, pins, etc? YES   - If Yes, were any placed within the last 2 years? End of september    Do you require any medications prior to a surgical procedure? No   - If Yes, for which procedure? n/a   - If Yes, what medications to you require? n/aa    Are you taking any medications that cause you to bleed more easily ("blood thinners") YES    Have you ever experienced a rapid heartbeat with epinephrine? No    Have you ever been treated with "gold" (gold sodium thiomalate) therapy?  No    [facility] can help with wrinkles, "laugh lines," facial volume loss, "double chin," "love handles," age spots, and more  Are you interested in learning today about some of the skin enhancement procedures that we offer? (If Yes, please provide more detail) No    Review of Systems:  Have you recently had or currently have any of the following? · Fever or chills: No  · Night Sweats: No  · Headaches: No  · Weight Gain: No  · Weight Loss: No  · Blurry Vision: No  · Nausea: No  · Vomiting: No  · Diarrhea: No  · Blood in Stool: No  · Abdominal Pain: No  · Itchy Skin: YES  · Painful Joints: YES  · Swollen Joints: No  · Muscle Pain: No  · Irregular Mole: YES  · Sun Burn: No  · Dry Skin: No  · Skin Color Changes: No  · Scar or Keloid: No  · Cold Sores/Fever Blisters: No  · Bacterial Infections/MRSA: No  · Anxiety: No  · Depression: No  · Suicidal or Homicidal Thoughts: No      PSYCH: Normal mood and affect  EYES: Normal conjunctiva  ENT: Normal lips and oral mucosa  CARDIOVASCULAR: No edema  RESPIRATORY: Normal respirations      ORGAN SYSTEM SKIN EXAM (SKIN)                          Back/Spine Normal except as noted below in Assessment                   CURETTAGE AND DESICCATION Malignant Lesion    A  Skin, left upper back, shave biopsy:     Atypical endophytic squamous proliferation; extending to the tissue edges (see note)      Note: 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  While the histologic differential includes trichilemmoma with atypia and other benign follicular tumors, these diagnoses are less favored given the degree and extent of cytologic atypia  Diagnosis: Squamous cell carcinoma IN SITU   Prior biopsy:  Yes   Access Number: [id number]   Location Left Upper Back   Size preop 0 5 cm x 0 3 cm    Size postop 1 7 cm x 1 5 cm     Additional History of Present Condition:  Prior biopsy, access number [id number], took pre-op antibiotics of clindamycin    Assessment and Plan:  Based on a thorough discussion of this condition and the management approach to it (including a comprehensive discussion of the known risks, side effects and potential benefits of treatment), the patient (family) agrees to treat the above described lesion with desiccation and curettage  Procedure:   The area was cleanly  prepped in usual manner   Anesthesia:1% xylocaine with epi     The above described lesion was aggressively curetted to mid dermis followed by desiccation   Number of cycles of desiccation and curettage 3   A clean dry dressing was placed on site  Oral and written postop care instructions were discussed and reviewed      DISCUSSION OF TREATMENT AND POSTOP CARE FOR PATIENT    What is curettage and desiccation? Curettage and desiccation is a type of electrosurgery in which a skin lesion is scraped off and heat is applied to the skin surface  What is involved in curettage and cautery? Your doctor will explain to you why your skin lesion needs treatment and the procedure involved  You may have to sign a consent form to indicate that you consent to the surgical procedure  Tell your doctor if you are taking any medication, or if you have any allergies or medical conditions  The doctor will inject some local anaesthetic into the area surrounding the lesion to be treated  This will make the skin go numb so no pain should be felt during the procedure  You may feel a pushing sensation but this should not be painful  The skin lesion is scraped off with a curette, which is like a small spoon with very sharp edges  The lesion should be sent to a pathology laboratory for analysis  The wound surface is then cauterised with a hot wire beaded tip or electrosurgical unit (diathermy)  This stops bleeding and helps destroys any remaining skin tumour cells  This procedure is usually repeated twice for malignant skin lesions (serial curettage and cautery)  A dressing may be applied and instructions should be given on how to care for your wound  What types of skin lesions can be treated by curettage?   Curettage is suitable to treat lesions where the material being scraped off is softer than the surrounding skin or when there is a natural cleavage plane between the lesion and the surrounding normal tissue  The following are sometimes treated by curettage:   Squamous cell carcinoma in situ    Actinic keratoses   Basal cell carcinomas that are large, deep or recurrent are usually not suitable for curettage  Lesions with poorly defined edges are also generally unsuitable  Curettage and desiccation is most often used in more superficial type basal cell carcinoma  Will I have a scar? Curettage often results in some sort of scar especially if accompanied by cautery  The scars from curettage are usually flat and round  They are a similar size to that of the original skin lesion  Some people have an abnormal response to skin healing and these people may get larger scars than usual (keloids and hypertrophic scarring)  How do I look after the wound following skin curettage? The wound may be tender when the local anaesthetic wears off   Leave the dressing in place till the nest day  Avoid strenuous exertion and stretching of the area   If there is any bleeding, press on the wound firmly with a folded towel without looking at it for 30 minutes  If it is still bleeding after this time, seek medical attention  Follow instructions a written in our consent ,  The wound from curettage will take approximately 2-3 weeks to heal over  The scar will initially be red and raised but usually reduces in colour and size over several months        Scribe Attestation    I,:   Elsa Richards MA am acting as a scribe while in the presence of the attending physician :        I,:   Rome Mcbride MD personally performed the services described in this documentation    as scribed in my presence :

## 2020-03-06 ENCOUNTER — TELEPHONE (OUTPATIENT)
Dept: DERMATOLOGY | Facility: CLINIC | Age: 75
End: 2020-03-06

## 2020-03-06 NOTE — TELEPHONE ENCOUNTER
Follow Up Call     Pt saw: Amando Garzon Were you prescribed any medications:No    o If YES: did you pick them up at the pharmacy? no     Did we do a biopsy? No,   o If YES: how does the biopsy site look? Good    Would you recommend your family and friends to [facility]?  Yes     How satisfied were you with your visit on a scale of 1-10: 10, Catalino Cordero was terrific

## 2020-12-30 ENCOUNTER — TELEPHONE (OUTPATIENT)
Dept: GASTROENTEROLOGY | Facility: CLINIC | Age: 75
End: 2020-12-30

## 2021-02-17 RX ORDER — ESCITALOPRAM OXALATE 20 MG/1
20 TABLET ORAL DAILY
COMMUNITY
Start: 2021-02-08

## 2021-02-22 ENCOUNTER — OFFICE VISIT (OUTPATIENT)
Dept: GASTROENTEROLOGY | Facility: CLINIC | Age: 76
End: 2021-02-22
Payer: COMMERCIAL

## 2021-02-22 ENCOUNTER — TELEPHONE (OUTPATIENT)
Dept: GASTROENTEROLOGY | Facility: CLINIC | Age: 76
End: 2021-02-22

## 2021-02-22 VITALS
SYSTOLIC BLOOD PRESSURE: 130 MMHG | DIASTOLIC BLOOD PRESSURE: 88 MMHG | HEART RATE: 86 BPM | WEIGHT: 104 LBS | BODY MASS INDEX: 21.01 KG/M2

## 2021-02-22 DIAGNOSIS — K29.70 GASTRITIS WITHOUT BLEEDING, UNSPECIFIED CHRONICITY, UNSPECIFIED GASTRITIS TYPE: ICD-10-CM

## 2021-02-22 DIAGNOSIS — K86.89 PANCREATIC DUCT DILATED: ICD-10-CM

## 2021-02-22 DIAGNOSIS — R13.19 ESOPHAGEAL DYSPHAGIA: ICD-10-CM

## 2021-02-22 DIAGNOSIS — K20.90 ESOPHAGITIS: ICD-10-CM

## 2021-02-22 DIAGNOSIS — K83.8 COMMON BILE DUCT DILATATION: Primary | ICD-10-CM

## 2021-02-22 PROCEDURE — 99204 OFFICE O/P NEW MOD 45 MIN: CPT | Performed by: PHYSICIAN ASSISTANT

## 2021-02-22 RX ORDER — MORPHINE SULFATE 30 MG/1
30 TABLET, FILM COATED, EXTENDED RELEASE ORAL EVERY 8 HOURS PRN
COMMUNITY
Start: 2021-02-15

## 2021-02-22 RX ORDER — PANTOPRAZOLE SODIUM 40 MG/1
40 TABLET, DELAYED RELEASE ORAL DAILY
Qty: 30 TABLET | Refills: 2 | Status: SHIPPED | OUTPATIENT
Start: 2021-02-22 | End: 2021-05-17

## 2021-02-22 RX ORDER — PRAVASTATIN SODIUM 40 MG
TABLET ORAL
COMMUNITY
Start: 2021-02-07

## 2021-02-22 NOTE — PROGRESS NOTES
[facility] - Outpatient Consultation  Lona West 66 y o  female MRN: [id number]  Encounter: [id number]          ASSESSMENT AND PLAN:      1  Common bile duct dilatation  2  Pancreatic duct dilated   - Incidentally noted on CT of the chest in December which was done for surveillance of prior lung cancer  - Plan for MRI abdomen w wo contrast/MRCP  - She will likely need a EUS/ERCP pending findings  - We discussed the importance of ruling out hepatobiliary malignancy and pancreatic malignancy given the dilation of both ducts; she is on chronic morphine which could potentially cause biliary dilatation but we discussed that the pancreatic duct abnormality is not necessarily explained by that  - She does not have abdominal symptoms and denies jaundice/fever  - Will obtain CBC, CMP, INR as well; CMP in September was WNL      3  Esophagitis  4  Gastritis without bleeding, unspecified chronicity, unspecified gastritis type  - Noted incidentally as well on CT chest; pt does admit to frequent heartburn symptoms as well as a globus sensation and intermittent food impactions in the past which she has been able to relieve on her own  - Will start protonix 40 mg daily  - Plan for EGD but will hold off on scheduling this until we have her MRCP results as we can combine this with whatever endoscopic procedure is needed based on the MRCP    ______________________________________________________________________    HPI:  Nabil Lopez is a pleasant 67 yo F with a PMH of chronic neck/back pain with spaces in place on chronic morphine, depression, breast cancer in remission, lung cancer in remission, Presenting for GI evaluation due to multiple abnormalities on a CT scan of the chest she had in December as a routine follow up for prior history of lung cancer  This was found to have dilation of the common bile duct up to 13 mm and pancreatic duct dilation to 8 mm    The CT scan was only of the chest in of the abdomen but there was concern for ampullary stenosis or malignancy  She was also noticed to have circumferential esophageal gastric wall thickening  She denies any abdominal pain, nausea, vomiting, abnormal bowel habits  She does report chronic reflux symptoms that she generally has ignored but started taking Pepcid daily for this  She does have episodes of globus sensation and dysphagia and what sounds like occasional food impactions which she is able to relieve on her own  She has never had an endoscopy before  She says she has had several colonoscopies in the past that were unremarkable         REVIEW OF SYSTEMS: Negative except for as stated above      Historical Information   Past Medical History:   Diagnosis Date    Arthritis     Cancer ([facility] [address] )     Chronic back pain     Spinal stenosis      Past Surgical History:   Procedure Laterality Date    JOINT REPLACEMENT      KNEE SURGERY      NECK SURGERY      spacers     SPINE SURGERY       Social History   Social History     Substance and Sexual Activity   Alcohol Use Not Currently    Frequency: Never     Social History     Substance and Sexual Activity   Drug Use Not on file     Social History     Tobacco Use   Smoking Status Never Smoker   Smokeless Tobacco Never Used     Family History   Problem Relation Age of Onset    Basal cell carcinoma Mother     Basal cell carcinoma Father     Squamous cell carcinoma Father        Meds/Allergies       Current Outpatient Medications:     ARIPiprazole (ABILIFY) 2 mg tablet    busPIRone HCl (BUSPAR PO)    celecoxib (CeleBREX) 200 mg capsule    CHOLECALCIFEROL-VITAMIN C PO    escitalopram (LEXAPRO) 20 mg tablet    fexofenadine (ALLEGRA) 60 MG tablet    fluticasone (FLONASE) 50 mcg/act nasal spray    gabapentin (NEURONTIN) 300 mg capsule    morphine (MS CONTIN) 30 mg 12 hr tablet    NON FORMULARY    pravastatin (PRAVACHOL) 40 mg tablet    tamoxifen (NOLVADEX) 20 mg tablet    zinc sulfate 50 mg capsule    ALPRAZolam (XANAX) 0 5 mg tablet    aspirin 81 mg chewable tablet    BACLOFEN PO    buPROPion (WELLBUTRIN XL) 150 mg 24 hr tablet    clindamycin (CLEOCIN) 150 mg capsule    diazepam (VALIUM) 5 mg tablet    doxycycline hyclate (VIBRA-TABS) 100 mg tablet    HYDROmorphone (DILAUDID) 2 mg tablet    levofloxacin (LEVAQUIN) 500 mg tablet    LORazepam (ATIVAN) 1 mg tablet    metaxalone (SKELAXIN) 400 MG tablet    methylprednisolone (MEDROL) 4 mg tablet    methylPREDNISolone 4 MG tablet therapy pack    morphine (MSIR) 15 mg tablet    Naloxone HCl (NARCAN) 4 MG/0 1ML LIQD    oxyCODONE HCl 5 MG TABA    pantoprazole (PROTONIX) 40 mg tablet    pravastatin (PRAVACHOL) 20 mg tablet    tiZANidine (ZANAFLEX) 4 mg tablet    Allergies   Allergen Reactions    Cefuroxime Anaphylaxis    Penicillins Anaphylaxis     Other reaction(s): Shock ( Severe,Abrupt Hypotension)    Cephalosporins      Other reaction(s): Hypotension  as per pt verbalization preoperatively    Hydromorphone Itching    Oxycodone-Acetaminophen Itching           Objective     Blood pressure 130/88, pulse 86, weight 47 2 kg (104 lb)  Body mass index is 21 01 kg/m²  PHYSICAL EXAM:      General Appearance:   Alert, cooperative, no distress   HEENT:   Normocephalic, atraumatic, anicteric      Neck:  Supple, symmetrical, trachea midline   Lungs:   Clear to auscultation bilaterally; no rales, rhonchi or wheezing; respirations unlabored    Heart[de-identified]   Regular rate and rhythm; no murmur, rub, or gallop  Abdomen:   Soft, non-tender, non-distended; normal bowel sounds; no masses, no organomegaly    Genitalia:   Deferred    Rectal:   Deferred    Extremities:  No cyanosis, clubbing or edema    Pulses:  2+ and symmetric    Skin:  No jaundice, rashes, or lesions    Lymph nodes:  No palpable cervical lymphadenopathy        Lab Results:   No visits with results within 1 Day(s) from this visit     Latest known visit with results is:   Office Visit on 01/27/2020   Component Date Value    Case Report 01/27/2020                      Value:Surgical Pathology Report                         Case: [id number]                                   Authorizing Provider:  Jenny Monroe MD            Collected:           01/27/2020 1325              Ordering Location:     [facility] Received:            01/27/2020 1325                                     Gap                                                                          Pathologist:           Marcio Petit MD                                                           Specimen:    Skin, Other, A: Left upper Back                                                            Final Diagnosis 01/27/2020                      Value: This result contains rich text formatting which cannot be displayed here   Additional Information 01/27/2020                      Value: This result contains rich text formatting which cannot be displayed here  Benjy Llanos Gross Description 01/27/2020                      Value: This result contains rich text formatting which cannot be displayed here   Clinical Information 01/27/2020                      Value:A: Left upper back; Skin; Shave; 66year old female with a 0 2 cm x 0 2 cm eroded pink papule with little scale  Differential irritated Seborrheic  Keratosis vs Squamous Cell Carcinoma  Attention Dr Marissa Christy         Radiology Results:   No results found

## 2021-03-07 ENCOUNTER — HOSPITAL ENCOUNTER (OUTPATIENT)
Dept: MRI IMAGING | Facility: HOSPITAL | Age: 76
Discharge: HOME | End: 2021-03-07
Payer: COMMERCIAL

## 2021-03-07 DIAGNOSIS — K83.8 COMMON BILE DUCT DILATATION: ICD-10-CM

## 2021-03-07 DIAGNOSIS — K86.89 PANCREATIC DUCT DILATED: ICD-10-CM

## 2021-03-07 PROCEDURE — G1004 CDSM NDSC: HCPCS

## 2021-03-07 PROCEDURE — A9585 GADOBUTROL INJECTION: HCPCS | Performed by: PHYSICIAN ASSISTANT

## 2021-03-07 PROCEDURE — 74183 MRI ABD W/O CNTR FLWD CNTR: CPT

## 2021-03-07 RX ADMIN — GADOBUTROL 5 ML: 604.72 INJECTION INTRAVENOUS at 12:11

## 2021-03-12 ENCOUNTER — TELEPHONE (OUTPATIENT)
Dept: GASTROENTEROLOGY | Facility: CLINIC | Age: 76
End: 2021-03-12

## 2021-03-12 ENCOUNTER — PREP FOR PROCEDURE (OUTPATIENT)
Dept: GASTROENTEROLOGY | Facility: CLINIC | Age: 76
End: 2021-03-12

## 2021-03-12 DIAGNOSIS — K20.90 ESOPHAGITIS: ICD-10-CM

## 2021-03-12 DIAGNOSIS — K86.89 DILATION OF PANCREATIC DUCT: Primary | ICD-10-CM

## 2021-03-12 DIAGNOSIS — K29.70 GASTRITIS, PRESENCE OF BLEEDING UNSPECIFIED, UNSPECIFIED CHRONICITY, UNSPECIFIED GASTRITIS TYPE: ICD-10-CM

## 2021-03-12 NOTE — TELEPHONE ENCOUNTER
EGD/EUS/ERCP scheduled on 3/23/21 with Dr Salvador at [facility] gave pt verbal instructions/mailed

## 2021-03-12 NOTE — TELEPHONE ENCOUNTER
----- Message from Jyoti Rodríguez PA-C sent at 3/12/2021 11:31 AM EST -----  Can you please help get her scheduled for a EGD/EUS/ERCP? This is for biliary dilatation, pancreatic duct dilation, and esophagitis/gastritis  She is aware of the MRI results and I discussed this with her and she is aware we are going to schedule these procedures  Thanks!

## 2021-03-23 ENCOUNTER — ANESTHESIA (OUTPATIENT)
Dept: GASTROENTEROLOGY | Facility: HOSPITAL | Age: 76
End: 2021-03-23

## 2021-03-23 ENCOUNTER — HOSPITAL ENCOUNTER (OUTPATIENT)
Dept: RADIOLOGY | Facility: HOSPITAL | Age: 76
Discharge: HOME | End: 2021-03-23
Payer: COMMERCIAL

## 2021-03-23 ENCOUNTER — HOSPITAL ENCOUNTER (OUTPATIENT)
Dept: GASTROENTEROLOGY | Facility: HOSPITAL | Age: 76
Setting detail: OUTPATIENT SURGERY
Discharge: HOME | End: 2021-03-23
Attending: INTERNAL MEDICINE
Payer: COMMERCIAL

## 2021-03-23 ENCOUNTER — ANESTHESIA EVENT (OUTPATIENT)
Dept: GASTROENTEROLOGY | Facility: HOSPITAL | Age: 76
End: 2021-03-23

## 2021-03-23 VITALS
BODY MASS INDEX: 20.96 KG/M2 | WEIGHT: 104 LBS | TEMPERATURE: 98.2 F | DIASTOLIC BLOOD PRESSURE: 70 MMHG | OXYGEN SATURATION: 100 % | SYSTOLIC BLOOD PRESSURE: 137 MMHG | RESPIRATION RATE: 16 BRPM | HEIGHT: 59 IN | HEART RATE: 70 BPM

## 2021-03-23 DIAGNOSIS — K86.89 DILATION OF PANCREATIC DUCT: ICD-10-CM

## 2021-03-23 DIAGNOSIS — K20.90 ESOPHAGITIS: ICD-10-CM

## 2021-03-23 DIAGNOSIS — K29.70 GASTRITIS, PRESENCE OF BLEEDING UNSPECIFIED, UNSPECIFIED CHRONICITY, UNSPECIFIED GASTRITIS TYPE: ICD-10-CM

## 2021-03-23 PROCEDURE — 43260 ERCP W/SPECIMEN COLLECTION: CPT | Performed by: INTERNAL MEDICINE

## 2021-03-23 PROCEDURE — C1769 GUIDE WIRE: HCPCS

## 2021-03-23 PROCEDURE — 43237 ENDOSCOPIC US EXAM ESOPH: CPT | Performed by: INTERNAL MEDICINE

## 2021-03-23 PROCEDURE — 74018 RADEX ABDOMEN 1 VIEW: CPT

## 2021-03-23 RX ORDER — PROPOFOL 10 MG/ML
INJECTION, EMULSION INTRAVENOUS AS NEEDED
Status: DISCONTINUED | OUTPATIENT
Start: 2021-03-23 | End: 2021-03-23

## 2021-03-23 RX ORDER — FENTANYL CITRATE 50 UG/ML
INJECTION, SOLUTION INTRAMUSCULAR; INTRAVENOUS AS NEEDED
Status: DISCONTINUED | OUTPATIENT
Start: 2021-03-23 | End: 2021-03-23

## 2021-03-23 RX ORDER — GLYCOPYRROLATE 0.2 MG/ML
INJECTION INTRAMUSCULAR; INTRAVENOUS AS NEEDED
Status: DISCONTINUED | OUTPATIENT
Start: 2021-03-23 | End: 2021-03-23

## 2021-03-23 RX ORDER — LIDOCAINE HYDROCHLORIDE 10 MG/ML
INJECTION, SOLUTION EPIDURAL; INFILTRATION; INTRACAUDAL; PERINEURAL AS NEEDED
Status: DISCONTINUED | OUTPATIENT
Start: 2021-03-23 | End: 2021-03-23

## 2021-03-23 RX ORDER — SODIUM CHLORIDE 9 MG/ML
INJECTION, SOLUTION INTRAVENOUS CONTINUOUS PRN
Status: DISCONTINUED | OUTPATIENT
Start: 2021-03-23 | End: 2021-03-23

## 2021-03-23 RX ORDER — ONDANSETRON 2 MG/ML
INJECTION INTRAMUSCULAR; INTRAVENOUS AS NEEDED
Status: DISCONTINUED | OUTPATIENT
Start: 2021-03-23 | End: 2021-03-23

## 2021-03-23 RX ORDER — SUCCINYLCHOLINE/SOD CL,ISO/PF 100 MG/5ML
SYRINGE (ML) INTRAVENOUS AS NEEDED
Status: DISCONTINUED | OUTPATIENT
Start: 2021-03-23 | End: 2021-03-23

## 2021-03-23 RX ORDER — DEXAMETHASONE SODIUM PHOSPHATE 10 MG/ML
INJECTION, SOLUTION INTRAMUSCULAR; INTRAVENOUS AS NEEDED
Status: DISCONTINUED | OUTPATIENT
Start: 2021-03-23 | End: 2021-03-23

## 2021-03-23 RX ADMIN — ONDANSETRON 4 MG: 2 INJECTION INTRAMUSCULAR; INTRAVENOUS at 12:17

## 2021-03-23 RX ADMIN — PROPOFOL 140 MG: 10 INJECTION, EMULSION INTRAVENOUS at 11:34

## 2021-03-23 RX ADMIN — LIDOCAINE HYDROCHLORIDE 40 MG: 10 INJECTION, SOLUTION EPIDURAL; INFILTRATION; INTRACAUDAL; PERINEURAL at 11:34

## 2021-03-23 RX ADMIN — Medication 60 MG: at 11:34

## 2021-03-23 RX ADMIN — GLYCOPYRROLATE 0.1 MG: 0.2 INJECTION, SOLUTION INTRAMUSCULAR; INTRAVENOUS at 12:17

## 2021-03-23 RX ADMIN — FENTANYL CITRATE 25 MCG: 50 INJECTION INTRAMUSCULAR; INTRAVENOUS at 11:46

## 2021-03-23 RX ADMIN — DEXAMETHASONE SODIUM PHOSPHATE 10 MG: 10 INJECTION, SOLUTION INTRAMUSCULAR; INTRAVENOUS at 12:17

## 2021-03-23 RX ADMIN — PHENYLEPHRINE HYDROCHLORIDE 200 MCG: 10 INJECTION INTRAVENOUS at 12:00

## 2021-03-23 RX ADMIN — SODIUM CHLORIDE: 9 INJECTION, SOLUTION INTRAVENOUS at 11:30

## 2021-03-23 NOTE — ANESTHESIA PREPROCEDURE EVALUATION
Procedure:  EGD  ENDOSCOPIC ULTRASOUND (UPPER)  ERCP    Relevant Problems   No relevant active problems        Physical Exam    Airway    Mallampati score: II  TM Distance: >3 FB  Neck ROM: full     Dental   No notable dental hx     Cardiovascular  Cardiovascular exam normal    Pulmonary  Pulmonary exam normal     Other Findings        Anesthesia Plan  ASA Score- 3     Anesthesia Type- general with ASA Monitors  Additional Monitors:   Airway Plan:           Plan Factors-Exercise tolerance (METS): <4 METS  Patient is not a current smoker  Patient not instructed to abstain from smoking on day of procedure  Patient did not smoke on day of surgery  Induction- intravenous  Postoperative Plan-     Informed Consent- Anesthetic plan and risks discussed with patient  I personally reviewed this patient with the CRNA  Discussed and agreed on the Anesthesia Plan with the CRNA  Mary Sevilla

## 2021-05-17 DIAGNOSIS — K20.90 ESOPHAGITIS: ICD-10-CM

## 2021-05-17 DIAGNOSIS — K29.70 GASTRITIS WITHOUT BLEEDING, UNSPECIFIED CHRONICITY, UNSPECIFIED GASTRITIS TYPE: ICD-10-CM

## 2021-05-17 RX ORDER — PANTOPRAZOLE SODIUM 40 MG/1
TABLET, DELAYED RELEASE ORAL
Qty: 90 TABLET | Refills: 1 | Status: SHIPPED | OUTPATIENT
Start: 2021-05-17

## 2021-11-24 ENCOUNTER — APPOINTMENT (EMERGENCY)
Dept: RADIOLOGY | Facility: HOSPITAL | Age: 76
DRG: 137 | End: 2021-11-24
Payer: COMMERCIAL

## 2021-11-24 ENCOUNTER — HOSPITAL ENCOUNTER (INPATIENT)
Facility: HOSPITAL | Age: 76
LOS: 2 days | Discharge: HOME | DRG: 137 | End: 2021-11-27
Attending: EMERGENCY MEDICINE | Admitting: INTERNAL MEDICINE
Payer: COMMERCIAL

## 2021-11-24 ENCOUNTER — APPOINTMENT (EMERGENCY)
Dept: CT IMAGING | Facility: HOSPITAL | Age: 76
DRG: 137 | End: 2021-11-24
Payer: COMMERCIAL

## 2021-11-24 DIAGNOSIS — R50.9 FUO (FEVER OF UNKNOWN ORIGIN): ICD-10-CM

## 2021-11-24 DIAGNOSIS — S01.512A: ICD-10-CM
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
## 2021-11-24 DIAGNOSIS — J21.9 BRONCHIOLITIS: ICD-10-CM

## 2021-11-24 DIAGNOSIS — S02.2XXA: ICD-10-CM

## 2021-11-24 DIAGNOSIS — M10.072 ACUTE IDIOPATHIC GOUT OF LEFT FOOT: ICD-10-CM

## 2021-11-24 LAB
ALBUMIN SERPL BCP-MCNC: 3.6 G/DL (ref 3.4–4.8)
ALP SERPL-CCNC: 40.2 U/L (ref 35–140)
ALT SERPL W P-5'-P-CCNC: 11 U/L (ref 5–54)
ANION GAP SERPL CALCULATED.3IONS-SCNC: 7 MMOL/L (ref 4–13)
APTT PPP: 33 SECONDS (ref 23–37)
AST SERPL W P-5'-P-CCNC: 15 U/L (ref 15–41)
BACTERIA UR QL AUTO: NORMAL /HPF
BASOPHILS # BLD AUTO: 0.02 THOUSANDS/ΜL (ref 0–0.1)
BASOPHILS NFR BLD AUTO: 0 % (ref 0–1)
BILIRUB SERPL-MCNC: 0.52 MG/DL (ref 0.3–1.2)
BILIRUB UR QL STRIP: NEGATIVE
BUN SERPL-MCNC: 9 MG/DL (ref 6–20)
CALCIUM SERPL-MCNC: 9 MG/DL (ref 8.4–10.2)
CHLORIDE SERPL-SCNC: 98 MMOL/L (ref 96–108)
CLARITY UR: CLEAR
CO2 SERPL-SCNC: 30 MMOL/L (ref 22–33)
COLOR UR: YELLOW
CREAT SERPL-MCNC: 0.78 MG/DL (ref 0.4–1.1)
EOSINOPHIL # BLD AUTO: 0.09 THOUSAND/ΜL (ref 0–0.61)
EOSINOPHIL NFR BLD AUTO: 1 % (ref 0–6)
ERYTHROCYTE [DISTWIDTH] IN BLOOD BY AUTOMATED COUNT: 12.5 % (ref 11.6–15.1)
FLUAV RNA RESP QL NAA+PROBE: NEGATIVE
FLUBV RNA RESP QL NAA+PROBE: NEGATIVE
GFR SERPL CREATININE-BSD FRML MDRD: 73 ML/MIN/1.73SQ M
GLUCOSE SERPL-MCNC: 103 MG/DL (ref 65–140)
GLUCOSE UR STRIP-MCNC: NEGATIVE MG/DL
HCT VFR BLD AUTO: 34.7 % (ref 34.8–46.1)
HGB BLD-MCNC: 11.1 G/DL (ref 11.5–15.4)
HGB UR QL STRIP.AUTO: ABNORMAL
IMM GRANULOCYTES # BLD AUTO: 0.01 THOUSAND/UL (ref 0–0.2)
IMM GRANULOCYTES NFR BLD AUTO: 0 % (ref 0–2)
INR PPP: 1.24 (ref 0.84–1.19)
KETONES UR STRIP-MCNC: NEGATIVE MG/DL
LACTATE SERPL-SCNC: 1.4 MMOL/L (ref 0–2)
LEUKOCYTE ESTERASE UR QL STRIP: NEGATIVE
LYMPHOCYTES # BLD AUTO: 1.14 THOUSANDS/ΜL (ref 0.6–4.47)
LYMPHOCYTES NFR BLD AUTO: 17 % (ref 14–44)
MAGNESIUM SERPL-MCNC: 1.7 MG/DL (ref 1.6–2.6)
MCH RBC QN AUTO: 30.6 PG (ref 26.8–34.3)
MCHC RBC AUTO-ENTMCNC: 32 G/DL (ref 31.4–37.4)
MCV RBC AUTO: 96 FL (ref 82–98)
MONOCYTES # BLD AUTO: 0.66 THOUSAND/ΜL (ref 0.17–1.22)
MONOCYTES NFR BLD AUTO: 10 % (ref 4–12)
NEUTROPHILS # BLD AUTO: 4.92 THOUSANDS/ΜL (ref 1.85–7.62)
NEUTS SEG NFR BLD AUTO: 72 % (ref 43–75)
NITRITE UR QL STRIP: NEGATIVE
NON-SQ EPI CELLS URNS QL MICRO: NORMAL /HPF
PH UR STRIP.AUTO: 6 [PH]
PLATELET # BLD AUTO: 199 THOUSANDS/UL (ref 149–390)
PMV BLD AUTO: 9.2 FL (ref 8.9–12.7)
POTASSIUM SERPL-SCNC: 3.4 MMOL/L (ref 3.5–5)
PROT SERPL-MCNC: 6.3 G/DL (ref 6.4–8.3)
PROT UR STRIP-MCNC: NEGATIVE MG/DL
PROTHROMBIN TIME: 15.4 SECONDS (ref 11.6–14.5)
RBC # BLD AUTO: 3.63 MILLION/UL (ref 3.81–5.12)
RBC #/AREA URNS AUTO: NORMAL /HPF
RSV RNA RESP QL NAA+PROBE: NEGATIVE
SARS-COV-2 RNA RESP QL NAA+PROBE: NEGATIVE
SODIUM SERPL-SCNC: 135 MMOL/L (ref 133–145)
SP GR UR STRIP.AUTO: <=1.005 (ref 1–1.03)
UROBILINOGEN UR QL STRIP.AUTO: 0.2 E.U./DL
WBC # BLD AUTO: 6.84 THOUSAND/UL (ref 4.31–10.16)
WBC #/AREA URNS AUTO: NORMAL /HPF

## 2021-11-24 PROCEDURE — 93005 ELECTROCARDIOGRAM TRACING: CPT

## 2021-11-24 PROCEDURE — 12011 RPR F/E/E/N/L/M 2.5 CM/<: CPT | Performed by: EMERGENCY MEDICINE

## 2021-11-24 PROCEDURE — 87040 BLOOD CULTURE FOR BACTERIA: CPT | Performed by: EMERGENCY MEDICINE

## 2021-11-24 PROCEDURE — 85025 COMPLETE CBC W/AUTO DIFF WBC: CPT | Performed by: EMERGENCY MEDICINE

## 2021-11-24 PROCEDURE — 99285 EMERGENCY DEPT VISIT HI MDM: CPT

## 2021-11-24 PROCEDURE — 85610 PROTHROMBIN TIME: CPT | Performed by: EMERGENCY MEDICINE

## 2021-11-24 PROCEDURE — 85730 THROMBOPLASTIN TIME PARTIAL: CPT | Performed by: EMERGENCY MEDICINE

## 2021-11-24 PROCEDURE — 96361 HYDRATE IV INFUSION ADD-ON: CPT

## 2021-11-24 PROCEDURE — 0CQ10ZZ REPAIR LOWER LIP, OPEN APPROACH: ICD-10-PCS | Performed by: EMERGENCY MEDICINE

## 2021-11-24 PROCEDURE — 72125 CT NECK SPINE W/O DYE: CPT

## 2021-11-24 PROCEDURE — 83605 ASSAY OF LACTIC ACID: CPT | Performed by: EMERGENCY MEDICINE

## 2021-11-24 PROCEDURE — G1004 CDSM NDSC: HCPCS

## 2021-11-24 PROCEDURE — 71250 CT THORAX DX C-: CPT

## 2021-11-24 PROCEDURE — 0241U HB NFCT DS VIR RESP RNA 4 TRGT: CPT | Performed by: EMERGENCY MEDICINE

## 2021-11-24 PROCEDURE — 90715 TDAP VACCINE 7 YRS/> IM: CPT | Performed by: EMERGENCY MEDICINE

## 2021-11-24 PROCEDURE — 81001 URINALYSIS AUTO W/SCOPE: CPT | Performed by: EMERGENCY MEDICINE

## 2021-11-24 PROCEDURE — 70450 CT HEAD/BRAIN W/O DYE: CPT

## 2021-11-24 PROCEDURE — 80053 COMPREHEN METABOLIC PANEL: CPT | Performed by: EMERGENCY MEDICINE

## 2021-11-24 PROCEDURE — 71045 X-RAY EXAM CHEST 1 VIEW: CPT

## 2021-11-24 PROCEDURE — 70486 CT MAXILLOFACIAL W/O DYE: CPT

## 2021-11-24 PROCEDURE — 83735 ASSAY OF MAGNESIUM: CPT | Performed by: EMERGENCY MEDICINE

## 2021-11-24 PROCEDURE — 99285 EMERGENCY DEPT VISIT HI MDM: CPT | Performed by: EMERGENCY MEDICINE

## 2021-11-24 PROCEDURE — 36415 COLL VENOUS BLD VENIPUNCTURE: CPT | Performed by: EMERGENCY MEDICINE

## 2021-11-24 PROCEDURE — 90471 IMMUNIZATION ADMIN: CPT

## 2021-11-24 PROCEDURE — 96365 THER/PROPH/DIAG IV INF INIT: CPT

## 2021-11-24 RX ORDER — ACETAMINOPHEN 325 MG/1
650 TABLET ORAL ONCE
Status: COMPLETED | OUTPATIENT
Start: 2021-11-24 | End: 2021-11-24

## 2021-11-24 RX ORDER — LIDOCAINE HYDROCHLORIDE 20 MG/ML
5 INJECTION, SOLUTION EPIDURAL; INFILTRATION; INTRACAUDAL; PERINEURAL ONCE
Status: COMPLETED | OUTPATIENT
Start: 2021-11-24 | End: 2021-11-24

## 2021-11-24 RX ORDER — POTASSIUM CHLORIDE 20 MEQ/1
40 TABLET, EXTENDED RELEASE ORAL ONCE
Status: COMPLETED | OUTPATIENT
Start: 2021-11-24 | End: 2021-11-24

## 2021-11-24 RX ORDER — CLINDAMYCIN PHOSPHATE 600 MG/50ML
600 INJECTION INTRAVENOUS ONCE
Status: COMPLETED | OUTPATIENT
Start: 2021-11-24 | End: 2021-11-24

## 2021-11-24 RX ADMIN — LIDOCAINE HYDROCHLORIDE 5 ML: 20 INJECTION, SOLUTION EPIDURAL; INFILTRATION; INTRACAUDAL; PERINEURAL at 17:16

## 2021-11-24 RX ADMIN — TETANUS TOXOID, REDUCED DIPHTHERIA TOXOID AND ACELLULAR PERTUSSIS VACCINE, ADSORBED 0.5 ML: 5; 2.5; 8; 8; 2.5 SUSPENSION INTRAMUSCULAR at 17:16

## 2021-11-24 RX ADMIN — ACETAMINOPHEN 650 MG: 325 TABLET, FILM COATED ORAL at 17:43

## 2021-11-24 RX ADMIN — CLINDAMYCIN PHOSPHATE 600 MG: 600 INJECTION, SOLUTION INTRAVENOUS at 17:55

## 2021-11-24 RX ADMIN — POTASSIUM CHLORIDE 40 MEQ: 1500 TABLET, EXTENDED RELEASE ORAL at 20:17

## 2021-11-24 RX ADMIN — SODIUM CHLORIDE 1416 ML: 0.9 INJECTION, SOLUTION INTRAVENOUS at 17:35

## 2021-11-25 PROBLEM — Z85.3 HISTORY OF BREAST CANCER: Status: ACTIVE | Noted: 2021-11-25

## 2021-11-25 PROBLEM — E87.6 HYPOKALEMIA: Status: ACTIVE | Noted: 2021-11-25

## 2021-11-25 PROBLEM — G89.29 CHRONIC PAIN: Status: ACTIVE | Noted: 2021-11-25

## 2021-11-25 PROBLEM — Z85.118 HISTORY OF LUNG CANCER: Status: ACTIVE | Noted: 2021-11-25

## 2021-11-25 PROBLEM — D64.9 ANEMIA: Status: ACTIVE | Noted: 2021-11-25

## 2021-11-25 LAB
ANION GAP SERPL CALCULATED.3IONS-SCNC: 5 MMOL/L (ref 4–13)
ATRIAL RATE: 102 BPM
BASOPHILS # BLD AUTO: 0.01 THOUSANDS/ΜL (ref 0–0.1)
BASOPHILS NFR BLD AUTO: 0 % (ref 0–1)
BUN SERPL-MCNC: 7 MG/DL (ref 6–20)
CALCIUM SERPL-MCNC: 8.4 MG/DL (ref 8.4–10.2)
CHLORIDE SERPL-SCNC: 109 MMOL/L (ref 96–108)
CO2 SERPL-SCNC: 29 MMOL/L (ref 22–33)
CREAT SERPL-MCNC: 0.73 MG/DL (ref 0.4–1.1)
EOSINOPHIL # BLD AUTO: 0.18 THOUSAND/ΜL (ref 0–0.61)
EOSINOPHIL NFR BLD AUTO: 3 % (ref 0–6)
ERYTHROCYTE [DISTWIDTH] IN BLOOD BY AUTOMATED COUNT: 12.6 % (ref 11.6–15.1)
GFR SERPL CREATININE-BSD FRML MDRD: 79 ML/MIN/1.73SQ M
GLUCOSE P FAST SERPL-MCNC: 85 MG/DL (ref 70–105)
GLUCOSE SERPL-MCNC: 85 MG/DL (ref 65–140)
HCT VFR BLD AUTO: 34.7 % (ref 34.8–46.1)
HGB BLD-MCNC: 11.1 G/DL (ref 11.5–15.4)
IMM GRANULOCYTES # BLD AUTO: 0.01 THOUSAND/UL (ref 0–0.2)
IMM GRANULOCYTES NFR BLD AUTO: 0 % (ref 0–2)
LYMPHOCYTES # BLD AUTO: 1.64 THOUSANDS/ΜL (ref 0.6–4.47)
LYMPHOCYTES NFR BLD AUTO: 31 % (ref 14–44)
MCH RBC QN AUTO: 30.5 PG (ref 26.8–34.3)
MCHC RBC AUTO-ENTMCNC: 32 G/DL (ref 31.4–37.4)
MCV RBC AUTO: 95 FL (ref 82–98)
MONOCYTES # BLD AUTO: 0.63 THOUSAND/ΜL (ref 0.17–1.22)
MONOCYTES NFR BLD AUTO: 12 % (ref 4–12)
NEUTROPHILS # BLD AUTO: 2.88 THOUSANDS/ΜL (ref 1.85–7.62)
NEUTS SEG NFR BLD AUTO: 54 % (ref 43–75)
P AXIS: 53 DEGREES
PLATELET # BLD AUTO: 217 THOUSANDS/UL (ref 149–390)
PMV BLD AUTO: 9.3 FL (ref 8.9–12.7)
POTASSIUM SERPL-SCNC: 4.3 MMOL/L (ref 3.5–5)
PR INTERVAL: 144 MS
PROCALCITONIN SERPL-MCNC: <0.05 NG/ML
QRS AXIS: -18 DEGREES
QRSD INTERVAL: 95 MS
QT INTERVAL: 388 MS
QTC INTERVAL: 503 MS
RBC # BLD AUTO: 3.64 MILLION/UL (ref 3.81–5.12)
SODIUM SERPL-SCNC: 143 MMOL/L (ref 133–145)
T WAVE AXIS: 116 DEGREES
VENTRICULAR RATE: 101 BPM
WBC # BLD AUTO: 5.35 THOUSAND/UL (ref 4.31–10.16)

## 2021-11-25 PROCEDURE — 80048 BASIC METABOLIC PNL TOTAL CA: CPT | Performed by: NURSE PRACTITIONER

## 2021-11-25 PROCEDURE — 93010 ELECTROCARDIOGRAM REPORT: CPT | Performed by: INTERNAL MEDICINE

## 2021-11-25 PROCEDURE — 99220 PR INITIAL OBSERVATION CARE/DAY 70 MINUTES: CPT | Performed by: INTERNAL MEDICINE

## 2021-11-25 PROCEDURE — 85025 COMPLETE CBC W/AUTO DIFF WBC: CPT | Performed by: NURSE PRACTITIONER

## 2021-11-25 PROCEDURE — 84145 PROCALCITONIN (PCT): CPT | Performed by: NURSE PRACTITIONER

## 2021-11-25 RX ORDER — TIZANIDINE HYDROCHLORIDE 4 MG/1
4 CAPSULE, GELATIN COATED ORAL AS NEEDED
COMMUNITY

## 2021-11-25 RX ORDER — MORPHINE SULFATE 30 MG/1
30 TABLET, FILM COATED, EXTENDED RELEASE ORAL EVERY 8 HOURS SCHEDULED
Status: DISCONTINUED | OUTPATIENT
Start: 2021-11-25 | End: 2021-11-27 | Stop reason: HOSPADM

## 2021-11-25 RX ORDER — ASPIRIN 81 MG/1
81 TABLET ORAL DAILY
COMMUNITY

## 2021-11-25 RX ORDER — PANTOPRAZOLE SODIUM 40 MG/1
40 TABLET, DELAYED RELEASE ORAL DAILY
Status: DISCONTINUED | OUTPATIENT
Start: 2021-11-25 | End: 2021-11-27 | Stop reason: HOSPADM

## 2021-11-25 RX ORDER — ECHINACEA PURPUREA EXTRACT 125 MG
1 TABLET ORAL
Status: DISCONTINUED | OUTPATIENT
Start: 2021-11-25 | End: 2021-11-27 | Stop reason: HOSPADM

## 2021-11-25 RX ORDER — METAXALONE 800 MG/1
400 TABLET ORAL 3 TIMES DAILY PRN
Status: DISCONTINUED | OUTPATIENT
Start: 2021-11-25 | End: 2021-11-27 | Stop reason: HOSPADM

## 2021-11-25 RX ORDER — ARIPIPRAZOLE 2 MG/1
2 TABLET ORAL DAILY
Status: DISCONTINUED | OUTPATIENT
Start: 2021-11-25 | End: 2021-11-27 | Stop reason: HOSPADM

## 2021-11-25 RX ORDER — TAMOXIFEN CITRATE 10 MG/1
20 TABLET ORAL DAILY
Status: DISCONTINUED | OUTPATIENT
Start: 2021-11-25 | End: 2021-11-27 | Stop reason: HOSPADM

## 2021-11-25 RX ORDER — ACETAMINOPHEN 325 MG/1
650 TABLET ORAL EVERY 6 HOURS PRN
Status: DISCONTINUED | OUTPATIENT
Start: 2021-11-25 | End: 2021-11-27 | Stop reason: HOSPADM

## 2021-11-25 RX ORDER — ESCITALOPRAM OXALATE 20 MG/1
20 TABLET ORAL DAILY
Status: DISCONTINUED | OUTPATIENT
Start: 2021-11-25 | End: 2021-11-27 | Stop reason: HOSPADM

## 2021-11-25 RX ORDER — CELECOXIB 100 MG/1
200 CAPSULE ORAL DAILY
Status: DISCONTINUED | OUTPATIENT
Start: 2021-11-25 | End: 2021-11-26

## 2021-11-25 RX ORDER — BUSPIRONE HYDROCHLORIDE 5 MG/1
15 TABLET ORAL 2 TIMES DAILY
Status: DISCONTINUED | OUTPATIENT
Start: 2021-11-25 | End: 2021-11-27 | Stop reason: HOSPADM

## 2021-11-25 RX ORDER — MORPHINE SULFATE 30 MG/1
30 TABLET, FILM COATED, EXTENDED RELEASE ORAL EVERY 8 HOURS PRN
Status: DISCONTINUED | OUTPATIENT
Start: 2021-11-25 | End: 2021-11-25

## 2021-11-25 RX ORDER — SACCHAROMYCES BOULARDII 250 MG
250 CAPSULE ORAL 2 TIMES DAILY
Status: DISCONTINUED | OUTPATIENT
Start: 2021-11-25 | End: 2021-11-27 | Stop reason: HOSPADM

## 2021-11-25 RX ORDER — GABAPENTIN 300 MG/1
300 CAPSULE ORAL 3 TIMES DAILY
Status: DISCONTINUED | OUTPATIENT
Start: 2021-11-25 | End: 2021-11-25

## 2021-11-25 RX ORDER — MELATONIN
1000 DAILY
Status: DISCONTINUED | OUTPATIENT
Start: 2021-11-25 | End: 2021-11-27 | Stop reason: HOSPADM

## 2021-11-25 RX ORDER — PRAVASTATIN SODIUM 40 MG
40 TABLET ORAL
Status: DISCONTINUED | OUTPATIENT
Start: 2021-11-25 | End: 2021-11-27 | Stop reason: HOSPADM

## 2021-11-25 RX ORDER — GABAPENTIN 300 MG/1
300 CAPSULE ORAL 3 TIMES DAILY
Status: DISCONTINUED | OUTPATIENT
Start: 2021-11-25 | End: 2021-11-27 | Stop reason: HOSPADM

## 2021-11-25 RX ORDER — CLINDAMYCIN PHOSPHATE 600 MG/50ML
600 INJECTION INTRAVENOUS EVERY 8 HOURS
Status: DISCONTINUED | OUTPATIENT
Start: 2021-11-25 | End: 2021-11-26

## 2021-11-25 RX ORDER — MORPHINE SULFATE 30 MG/1
30 TABLET, FILM COATED, EXTENDED RELEASE ORAL EVERY 8 HOURS SCHEDULED
Status: DISCONTINUED | OUTPATIENT
Start: 2021-11-25 | End: 2021-11-25

## 2021-11-25 RX ADMIN — Medication 1000 UNITS: at 09:30

## 2021-11-25 RX ADMIN — CLINDAMYCIN PHOSPHATE 600 MG: 600 INJECTION, SOLUTION INTRAVENOUS at 11:47

## 2021-11-25 RX ADMIN — PRAVASTATIN SODIUM 40 MG: 40 TABLET ORAL at 15:48

## 2021-11-25 RX ADMIN — GABAPENTIN 300 MG: 300 CAPSULE ORAL at 15:09

## 2021-11-25 RX ADMIN — BUSPIRONE HYDROCHLORIDE 15 MG: 10 TABLET ORAL at 17:45

## 2021-11-25 RX ADMIN — Medication 250 MG: at 09:28

## 2021-11-25 RX ADMIN — GABAPENTIN 300 MG: 300 CAPSULE ORAL at 09:28

## 2021-11-25 RX ADMIN — Medication 250 MG: at 17:44

## 2021-11-25 RX ADMIN — GABAPENTIN 300 MG: 300 CAPSULE ORAL at 02:42

## 2021-11-25 RX ADMIN — CLINDAMYCIN PHOSPHATE 600 MG: 600 INJECTION, SOLUTION INTRAVENOUS at 03:28

## 2021-11-25 RX ADMIN — ARIPIPRAZOLE 2 MG: 2 TABLET ORAL at 09:27

## 2021-11-25 RX ADMIN — MORPHINE SULFATE 30 MG: 30 TABLET, FILM COATED, EXTENDED RELEASE ORAL at 02:42

## 2021-11-25 RX ADMIN — BUSPIRONE HYDROCHLORIDE 15 MG: 10 TABLET ORAL at 09:29

## 2021-11-25 RX ADMIN — CLINDAMYCIN PHOSPHATE 600 MG: 600 INJECTION, SOLUTION INTRAVENOUS at 17:45

## 2021-11-25 RX ADMIN — ESCITALOPRAM OXALATE 20 MG: 20 TABLET ORAL at 09:30

## 2021-11-25 RX ADMIN — MORPHINE SULFATE 30 MG: 30 TABLET, FILM COATED, EXTENDED RELEASE ORAL at 21:09

## 2021-11-25 RX ADMIN — PANTOPRAZOLE SODIUM 40 MG: 40 TABLET, DELAYED RELEASE ORAL at 09:30

## 2021-11-25 RX ADMIN — TAMOXIFEN CITRATE 20 MG: 10 TABLET, FILM COATED ORAL at 09:27

## 2021-11-25 RX ADMIN — GABAPENTIN 300 MG: 300 CAPSULE ORAL at 21:09

## 2021-11-25 RX ADMIN — MORPHINE SULFATE 30 MG: 30 TABLET, FILM COATED, EXTENDED RELEASE ORAL at 15:08

## 2021-11-25 RX ADMIN — CELECOXIB 200 MG: 100 CAPSULE ORAL at 09:28

## 2021-11-26 ENCOUNTER — APPOINTMENT (OUTPATIENT)
Dept: RADIOLOGY | Facility: HOSPITAL | Age: 76
DRG: 137 | End: 2021-11-26
Payer: COMMERCIAL

## 2021-11-26 PROBLEM — M10.072 ACUTE IDIOPATHIC GOUT OF LEFT FOOT: Status: ACTIVE | Noted: 2021-11-26

## 2021-11-26 LAB
ALBUMIN SERPL BCP-MCNC: 3.2 G/DL (ref 3.4–4.8)
ALP SERPL-CCNC: 36.4 U/L (ref 35–140)
ALT SERPL W P-5'-P-CCNC: 8 U/L (ref 5–54)
ANION GAP SERPL CALCULATED.3IONS-SCNC: 4 MMOL/L (ref 4–13)
AST SERPL W P-5'-P-CCNC: 12 U/L (ref 15–41)
BASOPHILS # BLD AUTO: 0.02 THOUSANDS/ΜL (ref 0–0.1)
BASOPHILS NFR BLD AUTO: 0 % (ref 0–1)
BILIRUB SERPL-MCNC: 0.56 MG/DL (ref 0.3–1.2)
BUN SERPL-MCNC: 7 MG/DL (ref 6–20)
CALCIUM ALBUM COR SERPL-MCNC: 9.2 MG/DL (ref 8.3–10.1)
CALCIUM SERPL-MCNC: 8.6 MG/DL (ref 8.4–10.2)
CHLORIDE SERPL-SCNC: 104 MMOL/L (ref 96–108)
CO2 SERPL-SCNC: 31 MMOL/L (ref 22–33)
CREAT SERPL-MCNC: 0.88 MG/DL (ref 0.4–1.1)
CRP SERPL QL: 5.4 MG/DL (ref 0–1)
EOSINOPHIL # BLD AUTO: 0.13 THOUSAND/ΜL (ref 0–0.61)
EOSINOPHIL NFR BLD AUTO: 2 % (ref 0–6)
ERYTHROCYTE [DISTWIDTH] IN BLOOD BY AUTOMATED COUNT: 12.5 % (ref 11.6–15.1)
ERYTHROCYTE [SEDIMENTATION RATE] IN BLOOD: 38 MM/HOUR (ref 0–30)
GFR SERPL CREATININE-BSD FRML MDRD: 63 ML/MIN/1.73SQ M
GLUCOSE SERPL-MCNC: 106 MG/DL (ref 65–140)
HCT VFR BLD AUTO: 37.6 % (ref 34.8–46.1)
HGB BLD-MCNC: 12.2 G/DL (ref 11.5–15.4)
IMM GRANULOCYTES # BLD AUTO: 0.01 THOUSAND/UL (ref 0–0.2)
IMM GRANULOCYTES NFR BLD AUTO: 0 % (ref 0–2)
LYMPHOCYTES # BLD AUTO: 1.13 THOUSANDS/ΜL (ref 0.6–4.47)
LYMPHOCYTES NFR BLD AUTO: 16 % (ref 14–44)
MCH RBC QN AUTO: 30.7 PG (ref 26.8–34.3)
MCHC RBC AUTO-ENTMCNC: 32.4 G/DL (ref 31.4–37.4)
MCV RBC AUTO: 95 FL (ref 82–98)
MONOCYTES # BLD AUTO: 0.62 THOUSAND/ΜL (ref 0.17–1.22)
MONOCYTES NFR BLD AUTO: 9 % (ref 4–12)
NEUTROPHILS # BLD AUTO: 5.34 THOUSANDS/ΜL (ref 1.85–7.62)
NEUTS SEG NFR BLD AUTO: 73 % (ref 43–75)
PLATELET # BLD AUTO: 225 THOUSANDS/UL (ref 149–390)
PMV BLD AUTO: 9.1 FL (ref 8.9–12.7)
POTASSIUM SERPL-SCNC: 4.6 MMOL/L (ref 3.5–5)
PROCALCITONIN SERPL-MCNC: 0.05 NG/ML
PROT SERPL-MCNC: 5.6 G/DL (ref 6.4–8.3)
RBC # BLD AUTO: 3.98 MILLION/UL (ref 3.81–5.12)
SODIUM SERPL-SCNC: 139 MMOL/L (ref 133–145)
URATE SERPL-MCNC: 6.9 MG/DL (ref 2.6–8)
WBC # BLD AUTO: 7.25 THOUSAND/UL (ref 4.31–10.16)

## 2021-11-26 PROCEDURE — 86140 C-REACTIVE PROTEIN: CPT | Performed by: INTERNAL MEDICINE

## 2021-11-26 PROCEDURE — 80053 COMPREHEN METABOLIC PANEL: CPT | Performed by: INTERNAL MEDICINE

## 2021-11-26 PROCEDURE — 84145 PROCALCITONIN (PCT): CPT | Performed by: NURSE PRACTITIONER

## 2021-11-26 PROCEDURE — 73630 X-RAY EXAM OF FOOT: CPT

## 2021-11-26 PROCEDURE — 85025 COMPLETE CBC W/AUTO DIFF WBC: CPT | Performed by: INTERNAL MEDICINE

## 2021-11-26 PROCEDURE — 84550 ASSAY OF BLOOD/URIC ACID: CPT | Performed by: INTERNAL MEDICINE

## 2021-11-26 PROCEDURE — 99232 SBSQ HOSP IP/OBS MODERATE 35: CPT | Performed by: INTERNAL MEDICINE

## 2021-11-26 PROCEDURE — 85652 RBC SED RATE AUTOMATED: CPT | Performed by: INTERNAL MEDICINE

## 2021-11-26 RX ORDER — COLCHICINE 0.6 MG/1
0.6 TABLET ORAL DAILY
Status: DISCONTINUED | OUTPATIENT
Start: 2021-11-26 | End: 2021-11-27 | Stop reason: HOSPADM

## 2021-11-26 RX ORDER — IBUPROFEN 400 MG/1
400 TABLET ORAL EVERY 6 HOURS PRN
Status: DISCONTINUED | OUTPATIENT
Start: 2021-11-26 | End: 2021-11-27 | Stop reason: HOSPADM

## 2021-11-26 RX ORDER — COLCHICINE 0.6 MG/1
1.2 TABLET ORAL ONCE
Status: COMPLETED | OUTPATIENT
Start: 2021-11-26 | End: 2021-11-26

## 2021-11-26 RX ADMIN — PREDNISONE 30 MG: 20 TABLET ORAL at 10:43

## 2021-11-26 RX ADMIN — IBUPROFEN 400 MG: 400 TABLET, FILM COATED ORAL at 09:20

## 2021-11-26 RX ADMIN — Medication 1000 UNITS: at 08:15

## 2021-11-26 RX ADMIN — MORPHINE SULFATE 30 MG: 30 TABLET, FILM COATED, EXTENDED RELEASE ORAL at 05:25

## 2021-11-26 RX ADMIN — MORPHINE SULFATE 30 MG: 30 TABLET, FILM COATED, EXTENDED RELEASE ORAL at 21:23

## 2021-11-26 RX ADMIN — GABAPENTIN 300 MG: 300 CAPSULE ORAL at 15:32

## 2021-11-26 RX ADMIN — CELECOXIB 200 MG: 100 CAPSULE ORAL at 08:14

## 2021-11-26 RX ADMIN — TAMOXIFEN CITRATE 20 MG: 10 TABLET, FILM COATED ORAL at 08:17

## 2021-11-26 RX ADMIN — ESCITALOPRAM OXALATE 20 MG: 20 TABLET ORAL at 08:14

## 2021-11-26 RX ADMIN — BUSPIRONE HYDROCHLORIDE 15 MG: 10 TABLET ORAL at 17:38

## 2021-11-26 RX ADMIN — PANTOPRAZOLE SODIUM 40 MG: 40 TABLET, DELAYED RELEASE ORAL at 08:14

## 2021-11-26 RX ADMIN — CLINDAMYCIN PHOSPHATE 600 MG: 600 INJECTION, SOLUTION INTRAVENOUS at 02:54

## 2021-11-26 RX ADMIN — COLCHICINE 1.2 MG: 0.6 TABLET ORAL at 10:43

## 2021-11-26 RX ADMIN — BUSPIRONE HYDROCHLORIDE 15 MG: 10 TABLET ORAL at 08:14

## 2021-11-26 RX ADMIN — ARIPIPRAZOLE 2 MG: 2 TABLET ORAL at 08:18

## 2021-11-26 RX ADMIN — GABAPENTIN 300 MG: 300 CAPSULE ORAL at 21:22

## 2021-11-26 RX ADMIN — COLCHICINE 0.6 MG: 0.6 TABLET ORAL at 12:03

## 2021-11-26 RX ADMIN — MORPHINE SULFATE 30 MG: 30 TABLET, FILM COATED, EXTENDED RELEASE ORAL at 15:32

## 2021-11-26 RX ADMIN — GABAPENTIN 300 MG: 300 CAPSULE ORAL at 08:15

## 2021-11-26 RX ADMIN — PRAVASTATIN SODIUM 40 MG: 40 TABLET ORAL at 15:32

## 2021-11-26 RX ADMIN — Medication 250 MG: at 08:15

## 2021-11-26 RX ADMIN — Medication 250 MG: at 17:39

## 2021-11-27 VITALS
TEMPERATURE: 98 F | HEIGHT: 59 IN | OXYGEN SATURATION: 97 % | DIASTOLIC BLOOD PRESSURE: 83 MMHG | SYSTOLIC BLOOD PRESSURE: 134 MMHG | RESPIRATION RATE: 18 BRPM | WEIGHT: 97.66 LBS | HEART RATE: 94 BPM | BODY MASS INDEX: 19.69 KG/M2

## 2021-11-27 PROBLEM — E87.6 HYPOKALEMIA: Status: RESOLVED | Noted: 2021-11-25 | Resolved: 2021-11-27

## 2021-11-27 PROCEDURE — 99238 HOSP IP/OBS DSCHRG MGMT 30/<: CPT | Performed by: INTERNAL MEDICINE

## 2021-11-27 RX ORDER — COLCHICINE 0.6 MG/1
0.6 CAPSULE ORAL DAILY
Qty: 20 CAPSULE | Refills: 0 | Status: SHIPPED | OUTPATIENT
Start: 2021-11-27

## 2021-11-27 RX ORDER — PREDNISONE 10 MG/1
30 TABLET ORAL DAILY
Qty: 9 TABLET | Refills: 0 | Status: SHIPPED | OUTPATIENT
Start: 2021-11-28 | End: 2021-12-01

## 2021-11-27 RX ADMIN — TAMOXIFEN CITRATE 20 MG: 10 TABLET, FILM COATED ORAL at 08:05

## 2021-11-27 RX ADMIN — MORPHINE SULFATE 30 MG: 30 TABLET, FILM COATED, EXTENDED RELEASE ORAL at 05:56

## 2021-11-27 RX ADMIN — BUSPIRONE HYDROCHLORIDE 15 MG: 10 TABLET ORAL at 08:02

## 2021-11-27 RX ADMIN — PREDNISONE 30 MG: 20 TABLET ORAL at 08:02

## 2021-11-27 RX ADMIN — GABAPENTIN 300 MG: 300 CAPSULE ORAL at 08:02

## 2021-11-27 RX ADMIN — COLCHICINE 0.6 MG: 0.6 TABLET ORAL at 08:02

## 2021-11-27 RX ADMIN — ARIPIPRAZOLE 2 MG: 2 TABLET ORAL at 08:05

## 2021-11-27 RX ADMIN — Medication 250 MG: at 08:02

## 2021-11-27 RX ADMIN — Medication 1000 UNITS: at 08:02

## 2021-11-27 RX ADMIN — ESCITALOPRAM OXALATE 20 MG: 20 TABLET ORAL at 08:02

## 2021-11-27 RX ADMIN — PANTOPRAZOLE SODIUM 40 MG: 40 TABLET, DELAYED RELEASE ORAL at 08:02

## 2021-11-29 LAB
BACTERIA BLD CULT: NORMAL
BACTERIA BLD CULT: NORMAL